# Patient Record
Sex: MALE | Race: WHITE | Employment: FULL TIME | URBAN - NONMETROPOLITAN AREA
[De-identification: names, ages, dates, MRNs, and addresses within clinical notes are randomized per-mention and may not be internally consistent; named-entity substitution may affect disease eponyms.]

---

## 2018-08-07 ENCOUNTER — HOSPITAL ENCOUNTER (EMERGENCY)
Age: 43
Discharge: HOME | End: 2018-08-07
Payer: OTHER GOVERNMENT

## 2018-08-07 ENCOUNTER — APPOINTMENT (OUTPATIENT)
Dept: GENERAL RADIOLOGY | Age: 43
End: 2018-08-07
Payer: OTHER GOVERNMENT

## 2018-08-07 VITALS
HEIGHT: 68 IN | WEIGHT: 232 LBS | OXYGEN SATURATION: 97 % | TEMPERATURE: 97.2 F | DIASTOLIC BLOOD PRESSURE: 96 MMHG | HEART RATE: 84 BPM | BODY MASS INDEX: 35.16 KG/M2 | RESPIRATION RATE: 12 BRPM | SYSTOLIC BLOOD PRESSURE: 159 MMHG

## 2018-08-07 DIAGNOSIS — M25.562 ACUTE PAIN OF LEFT KNEE: Primary | ICD-10-CM

## 2018-08-07 DIAGNOSIS — M25.562 LEFT KNEE PAIN, UNSPECIFIED CHRONICITY: ICD-10-CM

## 2018-08-07 PROCEDURE — 73564 X-RAY EXAM KNEE 4 OR MORE: CPT

## 2018-08-07 PROCEDURE — 99284 EMERGENCY DEPT VISIT MOD MDM: CPT

## 2018-08-07 RX ORDER — IBUPROFEN 600 MG/1
600 TABLET ORAL EVERY 6 HOURS PRN
Qty: 15 TABLET | Refills: 0 | Status: SHIPPED | OUTPATIENT
Start: 2018-08-07 | End: 2021-10-17

## 2018-08-07 ASSESSMENT — ENCOUNTER SYMPTOMS
NAUSEA: 0
RHINORRHEA: 0
VOMITING: 0
DIARRHEA: 0
BACK PAIN: 0
ABDOMINAL PAIN: 0
SORE THROAT: 0
SHORTNESS OF BREATH: 0
COUGH: 0
EYE DISCHARGE: 0
WHEEZING: 0
EYE REDNESS: 0

## 2018-08-07 ASSESSMENT — PAIN SCALES - GENERAL: PAINLEVEL_OUTOF10: 7

## 2018-08-07 ASSESSMENT — PAIN DESCRIPTION - DESCRIPTORS: DESCRIPTORS: SHARP;SHOOTING

## 2018-08-07 ASSESSMENT — PAIN DESCRIPTION - FREQUENCY: FREQUENCY: INTERMITTENT

## 2018-08-07 ASSESSMENT — PAIN DESCRIPTION - PAIN TYPE: TYPE: ACUTE PAIN

## 2018-08-07 ASSESSMENT — PAIN DESCRIPTION - ORIENTATION: ORIENTATION: LEFT

## 2018-08-07 ASSESSMENT — PAIN DESCRIPTION - ONSET: ONSET: ON-GOING

## 2018-08-07 ASSESSMENT — PAIN DESCRIPTION - LOCATION: LOCATION: KNEE

## 2018-08-07 ASSESSMENT — PAIN DESCRIPTION - PROGRESSION: CLINICAL_PROGRESSION: GRADUALLY WORSENING

## 2018-08-07 NOTE — ED PROVIDER NOTES
joint swelling and neck pain. Skin: Negative for pallor and rash. Allergic/Immunologic: Negative for environmental allergies. Neurological: Positive for weakness. Negative for dizziness, syncope, light-headedness, numbness and headaches. Hematological: Negative for adenopathy. Psychiatric/Behavioral: Negative for confusion and suicidal ideas. The patient is not nervous/anxious. PAST MEDICAL HISTORY    has a past medical history of Asthma; Essential hypertension; Obesity (BMI 30.0-34.9); and Pneumonia. SURGICAL HISTORY      has no past surgical history on file. CURRENT MEDICATIONS       Discharge Medication List as of 8/7/2018  3:56 PM      CONTINUE these medications which have NOT CHANGED    Details   cyclobenzaprine (FLEXERIL) 10 MG tablet Take 10 mg by mouth 3 times daily as needed for Muscle spasms      azelastine (ASTELIN) 0.1 % nasal spray 1 spray by Nasal route 2 times daily for 14 days Use in each nostril as directed, Disp-1 Bottle, R-0      sodium chloride (AYR SALINE NASAL DROPS) 0.65 % (SOLN) SOLN nasal drops 2 drops by Each Nare route as needed (congestion)      traMADol (ULTRAM) 50 MG tablet Take 50 mg by mouth every 6 hours as needed for Pain      amLODIPine (NORVASC) 5 MG tablet Take 1 tablet by mouth daily, Disp-30 tablet, R-5      Elastic Bandages & Supports (KNEE BRACE/FLEX STAYS LARGE) MISC DAILY Starting 7/19/2016, Until Discontinued, Disp-1 each, R-0, Print             ALLERGIES     has No Known Allergies. FAMILY HISTORY     indicated that his mother is alive. He indicated that his father is alive. He indicated that the status of his neg hx is unknown.    family history includes Heart Disease in his father. SOCIAL HISTORY      reports that he has never smoked. He does not have any smokeless tobacco history on file. He reports that he drinks alcohol. He reports that he does not use drugs.     PHYSICAL EXAM     INITIAL VITALS:  height is 5' 8\" (1.727 m) and weight is their elevated blood pressure while in the ED and was advised to follow up with their PCP for blood pressure recheck and further management of their antihypertensive medications. CRITICAL CARE:   None    CONSULTS:  None    PROCEDURES:  None    FINAL IMPRESSION      1. Acute on chronic pain of left knee    2. Left knee pain, unspecified chronicity          DISPOSITION/PLAN     1. Acute on chronic pain of left knee    2. Left knee pain, unspecified chronicity        PATIENT REFERRED TO:  Flo Chicas [address]  [facility] [address] [facility] [address]  [phone]  Schedule an appointment as soon as possible for a visit       [place] [id number] [facility] [address]  [phone]  Schedule an appointment as soon as possible for a visit in 2 days      [address]:  Discharge Medication List as of 8/7/2018  3:56 PM          (Please note that portions of this note were completed with a voice recognition program.  Efforts were made to edit the dictations but occasionally words are mis-transcribed.)    Scribe:  Prateek Richards 8/7/18 3:14 PM Scribing for and in the presence of DERRICK Recio. Signed by: Jeanmarie Umana, 08/08/18 6:20 AM    Provider:  I personally performed the services described in the documentation, reviewed and edited the documentation which was dictated to the scribe in my presence, and it accurately records my words and actions.     DERRICK Recio 08/08/18 6:20 AM    DERRICK Recio'        Leticia Recio  08/08/18 6713

## 2018-08-07 NOTE — ED NOTES
Pt presents to ED with c/o left knee pain. Pt states he had knee surgery about a year ago and states he has had pain on and off. Pt states he started a new job where he is putting pressure on knee. Pt rates pain 6/10.      Belem Escobedo, LPN  35/02/25 7759

## 2019-07-13 ENCOUNTER — HOSPITAL ENCOUNTER (EMERGENCY)
Dept: GENERAL RADIOLOGY | Age: 44
Discharge: HOME | End: 2019-07-13
Payer: OTHER GOVERNMENT

## 2019-07-13 ENCOUNTER — HOSPITAL ENCOUNTER (EMERGENCY)
Age: 44
Discharge: HOME | End: 2019-07-13
Payer: OTHER GOVERNMENT

## 2019-07-13 VITALS
HEART RATE: 78 BPM | TEMPERATURE: 98.1 F | DIASTOLIC BLOOD PRESSURE: 83 MMHG | RESPIRATION RATE: 18 BRPM | WEIGHT: 240 LBS | SYSTOLIC BLOOD PRESSURE: 138 MMHG | OXYGEN SATURATION: 95 % | BODY MASS INDEX: 36.49 KG/M2

## 2019-07-13 DIAGNOSIS — S82.891A CLOSED FRACTURE OF RIGHT ANKLE, INITIAL ENCOUNTER: Primary | ICD-10-CM

## 2019-07-13 PROCEDURE — 29515 APPLICATION SHORT LEG SPLINT: CPT

## 2019-07-13 PROCEDURE — 73610 X-RAY EXAM OF ANKLE: CPT

## 2019-07-13 PROCEDURE — 99214 OFFICE O/P EST MOD 30 MIN: CPT | Performed by: NURSE PRACTITIONER

## 2019-07-13 PROCEDURE — 99214 OFFICE O/P EST MOD 30 MIN: CPT

## 2019-07-13 PROCEDURE — 29515 APPLICATION SHORT LEG SPLINT: CPT | Performed by: NURSE PRACTITIONER

## 2019-07-13 PROCEDURE — 2709999900 HC NON-CHARGEABLE SUPPLY

## 2019-07-13 RX ORDER — HYDROCODONE BITARTRATE AND ACETAMINOPHEN 5; 325 MG/1; MG/1
1 TABLET ORAL EVERY 6 HOURS PRN
Qty: 16 TABLET | Refills: 0 | Status: SHIPPED | OUTPATIENT
Start: 2019-07-13 | End: 2019-07-17

## 2019-07-13 ASSESSMENT — PAIN DESCRIPTION - DESCRIPTORS: DESCRIPTORS: SHARP;STABBING;THROBBING

## 2019-07-13 ASSESSMENT — PAIN DESCRIPTION - ORIENTATION: ORIENTATION: RIGHT

## 2019-07-13 ASSESSMENT — PAIN SCALES - GENERAL: PAINLEVEL_OUTOF10: 7

## 2019-07-13 ASSESSMENT — PAIN DESCRIPTION - PAIN TYPE: TYPE: ACUTE PAIN

## 2019-07-13 ASSESSMENT — PAIN DESCRIPTION - LOCATION: LOCATION: ANKLE

## 2019-07-13 ASSESSMENT — PAIN DESCRIPTION - FREQUENCY: FREQUENCY: CONTINUOUS

## 2019-07-13 NOTE — ED PROVIDER NOTES
[facility]       CHIEF COMPLAINT       Chief Complaint   Patient presents with    Ankle Injury     Pt was out back at his house and fell and hurt right ankle. Swollen. happened today. Nurses Notes reviewed and I agree except as noted in the HPI. HISTORY OF PRESENT ILLNESS   Joseph Fontaine is a 37 y.o. male who presents to the urgent care center complaining of pain to the right ankle. Patient states he was at his home this morning when he was carrying a umbrella getting ready for a graduation party when he twisted his right ankle going down a step. Patient states he heard a pop had sudden pain and currently rates his pain 7 on a 10 scale. The patient did not apply any ice or take anything for pain but states he came right here to be evaluated. Patient denies any other injuries. The history is provided by the patient. No  was used. Ankle Problem   Location:  Ankle  Time since incident:  1 hour  Injury: yes    Mechanism of injury comment:  His ankle at home while stepping off a step  Ankle location:  R ankle  Pain details:     Quality:  Aching    Radiates to:  Does not radiate    Severity:  Severe    Onset quality:  Sudden    Duration:  1 hour    Timing:  Constant    Progression:  Unchanged  Chronicity:  New  Dislocation: no    Foreign body present:  No foreign bodies  Tetanus status:  Unknown  Prior injury to area:  No  Relieved by:  Rest  Worsened by:  Abduction, adduction, bearing weight, flexion and extension  Ineffective treatments:  None tried  Associated symptoms: decreased ROM and stiffness    Risk factors: obesity    Risk factors: no concern for non-accidental trauma and no known bone disorder        REVIEW OF SYSTEMS     Review of Systems   Constitutional: Positive for activity change. Musculoskeletal: Positive for arthralgias, gait problem, joint swelling and stiffness.         Right  ankle and foot       PAST MEDICAL (FLEXERIL) 10 MG tablet Comments:   Reason for Stopping:         sodium chloride (AYR SALINE NASAL DROPS) 0.65 % (SOLN) SOLN nasal drops Comments:   Reason for Stopping:         traMADol (ULTRAM) 50 MG tablet Comments:   Reason for Stopping:         amLODIPine (NORVASC) 5 MG tablet Comments:   Reason for Stopping:               Discharge Medication List as of 7/13/2019  1:27 PM            Controlled Substance Monitoring:    Acute and Chronic Pain Monitoring:   RX Monitoring 7/13/2019   Periodic Controlled Substance Monitoring Possible medication side effects, risk of tolerance/dependence & alternative treatments discussed. ;No signs of potential drug abuse or diversion identified. Patient give standard precautions with this class of medications such as may cause drowsiness. May impair ability to operate vehicles or machinery. Do not use in combination with alcohol while taking this medication                                        Supportive Documentation of Scheduled Medication    1. Patient's medical history, current medications,and those prescribed by other   providers and OARRS, were reviewed by me. 2. Physical exam revealed see assessment portion of chart. 3. Diagnosis: Right ankle fracture    4. Contraindications were ruled out, no current pain medication usage, no drug interactions with current medication, no documented history of substance abuse or inpatient/outpatient rehabilitation.      5. Rationale for prescription(s): Norco 5/325 1 p.o. every 6 hours for pain #12 with no refills    JAIME Arana CNP    (Please note that portions of this note were completed with a voice recognition program. Efforts were made to edit the dictations but occasionally words are mis-transcribed.)           JAIME Arana CNP  07/13/19 4506

## 2019-07-13 NOTE — ED TRIAGE NOTES
Pt was wheeled into room 7 by wife. Pt here with complaints of a right foot injury. Pt was at back and stepped wrong and fell. Right ankle swollen and painful. Happened today at his house.

## 2020-01-06 ENCOUNTER — HOSPITAL ENCOUNTER (EMERGENCY)
Age: 45
Discharge: HOME | End: 2020-01-06
Payer: OTHER GOVERNMENT

## 2020-01-06 VITALS
OXYGEN SATURATION: 95 % | RESPIRATION RATE: 16 BRPM | HEART RATE: 80 BPM | BODY MASS INDEX: 37.71 KG/M2 | WEIGHT: 248 LBS | SYSTOLIC BLOOD PRESSURE: 161 MMHG | DIASTOLIC BLOOD PRESSURE: 99 MMHG | TEMPERATURE: 98.7 F

## 2020-01-06 LAB
FLU A ANTIGEN: NEGATIVE
FLU B ANTIGEN: NEGATIVE

## 2020-01-06 PROCEDURE — 87804 INFLUENZA ASSAY W/OPTIC: CPT

## 2020-01-06 PROCEDURE — 99214 OFFICE O/P EST MOD 30 MIN: CPT

## 2020-01-06 PROCEDURE — 99213 OFFICE O/P EST LOW 20 MIN: CPT | Performed by: NURSE PRACTITIONER

## 2020-01-06 RX ORDER — AZITHROMYCIN 250 MG/1
TABLET, FILM COATED ORAL
Qty: 6 TABLET | Refills: 0 | Status: SHIPPED | OUTPATIENT
Start: 2020-01-06 | End: 2021-10-17 | Stop reason: ALTCHOICE

## 2020-01-06 RX ORDER — PREDNISONE 10 MG/1
TABLET ORAL
Qty: 14 TABLET | Refills: 0 | Status: SHIPPED | OUTPATIENT
Start: 2020-01-06 | End: 2021-10-17 | Stop reason: ALTCHOICE

## 2020-01-06 ASSESSMENT — PAIN DESCRIPTION - PAIN TYPE: TYPE: ACUTE PAIN

## 2020-01-06 ASSESSMENT — PAIN DESCRIPTION - DESCRIPTORS: DESCRIPTORS: ACHING

## 2020-01-06 ASSESSMENT — PAIN DESCRIPTION - LOCATION: LOCATION: RIB CAGE

## 2020-01-06 ASSESSMENT — PAIN DESCRIPTION - ORIENTATION: ORIENTATION: RIGHT;LEFT

## 2020-01-06 ASSESSMENT — PAIN DESCRIPTION - FREQUENCY: FREQUENCY: INTERMITTENT

## 2020-01-06 ASSESSMENT — PAIN SCALES - GENERAL: PAINLEVEL_OUTOF10: 5

## 2020-01-06 NOTE — ED PROVIDER NOTES
[facility]  Urgent Care Encounter       CHIEF COMPLAINT       Chief Complaint   Patient presents with    Cough     productive, post nasal drainage, fever       Nurses Notes reviewed and I agree except as noted in the HPI. HISTORY OF PRESENT ILLNESS   Christian Henderson is a 40 y.o. male who presents     Patient states that for the last week he has had more productive cough with increased postnasal drainage. He states that he is also had some fevers, as high as 101. Reports burning sensation when inhaling. He states that he has exercise-induced asthma, but has not had to use an inhaler. Denies ever being a smoker. States that he has been trying over-the-counter cough suppressants and decongestants neither of which seem to be very helpful. Cough   Cough characteristics:  Non-productive  Sputum characteristics:  Nondescript  Severity:  Mild  Onset quality:  Gradual  Duration:  5 days  Timing:  Intermittent  Progression:  Unchanged  Chronicity:  New  Smoker: no    Context: not animal exposure, not exposure to allergens, not fumes, not occupational exposure, not sick contacts, not smoke exposure, not upper respiratory infection, not weather changes and not with activity    Relieved by:  Nothing  Worsened by:  Nothing  Ineffective treatments:  Cough suppressants and decongestant  Associated symptoms: sinus congestion    Associated symptoms: no chest pain, no chills, no ear fullness, no ear pain, no eye discharge, no fever, no headaches, no rash, no rhinorrhea, no shortness of breath, no sore throat, no weight loss and no wheezing    Risk factors: no chemical exposure, no recent infection and no recent travel        REVIEW OF SYSTEMS     Review of Systems   Constitutional: Negative for appetite change, chills, fatigue, fever and weight loss. HENT: Positive for congestion, postnasal drip, sinus pressure and sinus pain. Negative for ear pain, rhinorrhea and sore throat.     Eyes: Negative for discharge. Respiratory: Positive for cough. Negative for chest tightness, shortness of breath, wheezing and stridor. Cardiovascular: Negative for chest pain. Gastrointestinal: Negative for nausea and vomiting. Musculoskeletal: Negative for neck pain and neck stiffness. Skin: Negative for rash. Neurological: Negative for dizziness, weakness, light-headedness, numbness and headaches. PAST MEDICAL HISTORY         Diagnosis Date    Asthma     Essential hypertension 8/17/2016    Obesity (BMI 30.0-34. 9) 8/17/2016    Pneumonia        SURGICALHISTORY     Patient  has a past surgical history that includes knee surgery and toenail excision. CURRENT MEDICATIONS       Discharge Medication List as of 1/6/2020  2:33 PM      CONTINUE these medications which have NOT CHANGED    Details   DM-Phenylephrine-Acetaminophen (VICKS DAYQUIL COLD & FLU) 10-5-325 MG/15ML LIQD Take 30 mLs by mouthHistorical Med      ibuprofen (ADVIL;MOTRIN) 600 MG tablet Take 1 tablet by mouth every 6 hours as needed for Pain, Disp-15 tablet, R-0Print      Elastic Bandages & Supports (KNEE BRACE/FLEX STAYS LARGE) MISC DAILY Starting 7/19/2016, Until Discontinued, Disp-1 each, R-0, Print             ALLERGIES     Patient is has No Known Allergies. Patients   There is no immunization history on file for this patient. FAMILY HISTORY     Patient's family history includes Heart Disease in his father; High Blood Pressure in his mother. SOCIAL HISTORY     Patient  reports that he has never smoked. He has never used smokeless tobacco. He reports current alcohol use. He reports that he does not use drugs. PHYSICAL EXAM     ED TRIAGE VITALS  BP: (!) 161/99, Temp: 98.7 °F (37.1 °C), Pulse: 80, Resp: 16, SpO2: 95 %,Estimated body mass index is 37.71 kg/m² as calculated from the following:    Height as of 8/7/18: 5' 8\" (1.727 m). Weight as of this encounter: 248 lb (112.5 kg). ,No LMP for male patient.     Physical Exam  Constitutional:       General: He is not in acute distress. Appearance: Normal appearance. He is not ill-appearing, toxic-appearing or diaphoretic. HENT:      Nose: Congestion present. No rhinorrhea. Mouth/Throat:      Mouth: Mucous membranes are moist.      Pharynx: No oropharyngeal exudate or posterior oropharyngeal erythema. Cardiovascular:      Rate and Rhythm: Normal rate. Heart sounds: Normal heart sounds. No murmur. No friction rub. No gallop. Pulmonary:      Effort: Pulmonary effort is normal. No accessory muscle usage or respiratory distress. Breath sounds: Normal breath sounds. No stridor or decreased air movement. No decreased breath sounds, wheezing, rhonchi or rales. Chest:      Chest wall: No tenderness. Musculoskeletal: Normal range of motion. Skin:     General: Skin is warm. Findings: No erythema or rash. Neurological:      General: No focal deficit present. Mental Status: He is alert and oriented to person, place, and time. Sensory: No sensory deficit. Psychiatric:         Mood and Affect: Mood normal.         Behavior: Behavior normal.         Thought Content: Thought content normal.         Judgment: Judgment normal.         DIAGNOSTIC RESULTS     Labs:  Results for orders placed or performed during the hospital encounter of 01/06/20   Rapid influenza A/B antigens   Result Value Ref Range    Flu A Antigen NEGATIVE NEGATIVE    Flu B Antigen NEGATIVE NEGATIVE       IMAGING:    No orders to display     URGENT CARE COURSE:     Vitals:    01/06/20 1409   BP: (!) 161/99   Pulse: 80   Resp: 16   Temp: 98.7 °F (37.1 °C)   TempSrc: Oral   SpO2: 95%   Weight: 248 lb (112.5 kg)       Medications - No data to display         PROCEDURES:  None    FINAL IMPRESSION      1. Pleurisy    2. Bronchitis    3. Cough    4.  Upper respiratory tract infection, unspecified type          DISPOSITION/ PLAN   Patient is discharged home with prescription for azithromycin as well as prednisone tapering dose for the next 6 days. Discussed with patient that most respiratory infections are viral, given that his symptoms seem to be lingering and not improving even with use of over-the-counter medications, do feel use of antibiotics and steroid is appropriate. Patient is advised to use mist humidifiers at home, as well as adequate fluid hydration and Tylenol and Motrin. Patient may continue use of over-the-counter cough suppressants at bedtime. Patient should follow-up with his primary care provider within the next 3 to 4 days if symptoms do not seem to be improving.         PATIENT REFERRED TO:  JAIME Anaya CNP  [address]  / [facility] [address]      DISCHARGE MEDICATIONS:  Discharge Medication List as of 1/6/2020  2:33 PM      START taking these medications    Details   predniSONE (DELTASONE) 10 MG tablet 40 mg for days 1 & 2, 20 mg for days 3 & 4, 10 mg for days 5 & 6, Disp-14 tablet, R-0Print      azithromycin (ZITHROMAX) 250 MG tablet 500 mg for day 1, 250 mg for days 2-5, Disp-6 tablet, R-0Print             Discharge Medication List as of 1/6/2020  2:33 PM          Discharge Medication List as of 1/6/2020  2:33 PM          JAIME Zaidi NP    (Please note that portions of this note were completed with a voice recognition program. Efforts were made to edit the dictations but occasionally words are mis-transcribed.)         JAIME Miguel NP  01/10/20 5643

## 2020-01-06 NOTE — ED NOTES
Pt. Released in stable condition, ambulated per self to private car. Instructed pt to follow-up with family doctor as needed for recheck or go directly to the emergency department for any concerns/worsening conditions. Pt. Verbalized understanding of instructions. No questions at this time. RX in hand.       Pushpa Elena RN  01/06/20 9563

## 2020-01-10 ASSESSMENT — ENCOUNTER SYMPTOMS
SHORTNESS OF BREATH: 0
NAUSEA: 0
SORE THROAT: 0
RHINORRHEA: 0
CHEST TIGHTNESS: 0
WHEEZING: 0
SINUS PAIN: 1
COUGH: 1
SINUS PRESSURE: 1
SINUS CONGESTION: 1
STRIDOR: 0
VOMITING: 0
EYE DISCHARGE: 0

## 2020-11-20 ENCOUNTER — APPOINTMENT (OUTPATIENT)
Dept: GENERAL RADIOLOGY | Age: 45
End: 2020-11-20
Payer: OTHER GOVERNMENT

## 2020-11-20 ENCOUNTER — HOSPITAL ENCOUNTER (EMERGENCY)
Age: 45
Discharge: HOME | End: 2020-11-20
Payer: OTHER GOVERNMENT

## 2020-11-20 VITALS
SYSTOLIC BLOOD PRESSURE: 160 MMHG | TEMPERATURE: 97.4 F | RESPIRATION RATE: 19 BRPM | DIASTOLIC BLOOD PRESSURE: 99 MMHG | OXYGEN SATURATION: 98 % | HEART RATE: 99 BPM

## 2020-11-20 PROCEDURE — 6370000000 HC RX 637 (ALT 250 FOR IP): Performed by: NURSE PRACTITIONER

## 2020-11-20 PROCEDURE — 73610 X-RAY EXAM OF ANKLE: CPT

## 2020-11-20 PROCEDURE — 99283 EMERGENCY DEPT VISIT LOW MDM: CPT

## 2020-11-20 RX ORDER — LIDOCAINE 4 G/G
1 PATCH TOPICAL DAILY
Qty: 30 PATCH | Refills: 0 | Status: SHIPPED | OUTPATIENT
Start: 2020-11-20 | End: 2020-12-20

## 2020-11-20 RX ORDER — NAPROXEN 250 MG/1
500 TABLET ORAL ONCE
Status: COMPLETED | OUTPATIENT
Start: 2020-11-20 | End: 2020-11-20

## 2020-11-20 RX ORDER — LIDOCAINE 4 G/G
1 PATCH TOPICAL DAILY
Status: DISCONTINUED | OUTPATIENT
Start: 2020-11-20 | End: 2020-11-20 | Stop reason: HOSPADM

## 2020-11-20 RX ORDER — NAPROXEN 500 MG/1
500 TABLET ORAL 2 TIMES DAILY WITH MEALS
Qty: 30 TABLET | Refills: 0 | Status: SHIPPED | OUTPATIENT
Start: 2020-11-20 | End: 2021-10-17 | Stop reason: ALTCHOICE

## 2020-11-20 RX ADMIN — NAPROXEN 500 MG: 250 TABLET ORAL at 16:16

## 2020-11-20 ASSESSMENT — ENCOUNTER SYMPTOMS
PHOTOPHOBIA: 0
SHORTNESS OF BREATH: 0
ABDOMINAL PAIN: 0
EYE PAIN: 1

## 2020-11-20 ASSESSMENT — PAIN DESCRIPTION - PAIN TYPE: TYPE: ACUTE PAIN

## 2020-11-20 ASSESSMENT — PAIN SCALES - GENERAL: PAINLEVEL_OUTOF10: 8

## 2020-11-20 ASSESSMENT — PAIN DESCRIPTION - LOCATION: LOCATION: ANKLE;BACK;HEAD
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
## 2021-10-17 ENCOUNTER — APPOINTMENT (OUTPATIENT)
Dept: GENERAL RADIOLOGY | Age: 46
End: 2021-10-17
Payer: OTHER GOVERNMENT

## 2021-10-17 ENCOUNTER — HOSPITAL ENCOUNTER (EMERGENCY)
Age: 46
Discharge: HOME | End: 2021-10-17
Payer: OTHER GOVERNMENT

## 2021-10-17 VITALS
RESPIRATION RATE: 16 BRPM | OXYGEN SATURATION: 95 % | SYSTOLIC BLOOD PRESSURE: 165 MMHG | TEMPERATURE: 97 F | HEART RATE: 69 BPM | DIASTOLIC BLOOD PRESSURE: 88 MMHG

## 2021-10-17 DIAGNOSIS — S20.211A RIB CONTUSION, RIGHT, INITIAL ENCOUNTER: Primary | ICD-10-CM

## 2021-10-17 DIAGNOSIS — S29.9XXA RIB INJURY: ICD-10-CM

## 2021-10-17 PROCEDURE — 99212 OFFICE O/P EST SF 10 MIN: CPT | Performed by: NURSE PRACTITIONER

## 2021-10-17 PROCEDURE — 99213 OFFICE O/P EST LOW 20 MIN: CPT

## 2021-10-17 PROCEDURE — 71101 X-RAY EXAM UNILAT RIBS/CHEST: CPT

## 2021-10-17 RX ORDER — CYCLOBENZAPRINE HCL 5 MG
5 TABLET ORAL NIGHTLY
Qty: 5 TABLET | Refills: 0 | Status: SHIPPED | OUTPATIENT
Start: 2021-10-17 | End: 2021-10-22

## 2021-10-17 ASSESSMENT — PAIN SCALES - GENERAL: PAINLEVEL_OUTOF10: 6

## 2021-10-17 ASSESSMENT — ENCOUNTER SYMPTOMS
NAUSEA: 0
WHEEZING: 0
COLOR CHANGE: 0
SHORTNESS OF BREATH: 0
CHEST TIGHTNESS: 0
VOMITING: 0
DIARRHEA: 0
COUGH: 0

## 2021-10-17 NOTE — ED PROVIDER NOTES
DAILY Starting 7/19/2016, Until Discontinued, Disp-1 each, R-0, Print             ALLERGIES     Patient is has No Known Allergies. Patients   There is no immunization history on file for this patient. FAMILY HISTORY     Patient's family history includes Heart Disease in his father; High Blood Pressure in his mother. SOCIAL HISTORY     Patient  reports that he has never smoked. He has never used smokeless tobacco. He reports current alcohol use. He reports that he does not use drugs. PHYSICAL EXAM     ED TRIAGE VITALS  BP: (!) 165/88, Temp: 97 °F (36.1 °C), Pulse: 69, Resp: 16, SpO2: 95 %,Estimated body mass index is 37.71 kg/m² as calculated from the following:    Height as of 8/7/18: 5' 8\" (1.727 m). Weight as of 1/6/20: 248 lb (112.5 kg). ,No LMP for male patient. Physical Exam  Constitutional:       General: He is not in acute distress. Appearance: Normal appearance. He is not ill-appearing, toxic-appearing or diaphoretic. HENT:      Nose: Nose normal.      Mouth/Throat:      Mouth: Mucous membranes are moist.   Cardiovascular:      Rate and Rhythm: Normal rate. Pulses: Normal pulses. Heart sounds: Normal heart sounds. No murmur heard. No friction rub. No gallop. Pulmonary:      Effort: Pulmonary effort is normal. No respiratory distress. Breath sounds: Normal breath sounds. No stridor. No wheezing, rhonchi or rales. Chest:      Chest wall: No tenderness. Abdominal:          Comments: Tenderness to ribs with palpations   Musculoskeletal:         General: Swelling (Mild swelling to point of tenderness), tenderness (right side ribs) and signs of injury (fall at work, yesterday, landing on right side) present. No deformity. Normal range of motion. Right lower leg: No edema. Left lower leg: No edema. Skin:     General: Skin is warm. Capillary Refill: Capillary refill takes less than 2 seconds. Findings: No bruising, erythema or rash.    Neurological: General: No focal deficit present. Mental Status: He is alert and oriented to person, place, and time. Sensory: No sensory deficit. Motor: No weakness. Psychiatric:         Mood and Affect: Mood normal.         Behavior: Behavior normal.         Thought Content: Thought content normal.         Judgment: Judgment normal.         DIAGNOSTIC RESULTS     Labs:No results found for this visit on 10/17/21. IMAGING:    XR RIBS RIGHT INCLUDE CHEST (MIN 3 VIEWS)   Final Result    No fracture. **This report has been created using voice recognition software. It may contain minor errors which are inherent in voice recognition technology. **      Final report electronically signed by Dr. Arie Mccoy on 10/17/2021 11:44 AM        URGENT CARE COURSE:     Vitals:    10/17/21 1057   BP: (!) 165/88   Pulse: 69   Resp: 16   Temp: 97 °F (36.1 °C)   TempSrc: Temporal   SpO2: 95%       Medications - No data to display         PROCEDURES:  None    FINAL IMPRESSION      1. Rib contusion, right, initial encounter    2. Rib injury          DISPOSITION/ PLAN   Patient is discharged home with prescription for Flexeril that he may take at bedtime for muscle spasms due to recent injury of right ribs. Patient is informed that his x-ray is negative for any fractures. Continue use of over-the-counter Tylenol, Aleve, and heating pad. Patient is encouraged to follow-up with occupational health in the next 2 to 3 days if there does not appear to be any improvement of symptoms. PATIENT REFERRED TO:  No primary care provider on file. No primary physician on file.       DISCHARGE MEDICATIONS:  Discharge Medication List as of 10/17/2021 12:11 PM      START taking these medications    Details   cyclobenzaprine (FLEXERIL) 5 MG tablet Take 1 tablet by mouth nightly for 5 days, Disp-5 tablet, R-0Normal             Discharge Medication List as of 10/17/2021 12:11 PM      STOP taking these medications

## 2021-10-17 NOTE — Clinical Note
Judy Mauricio was seen and treated in our emergency department on 10/17/2021. He may return to work on 10/19/2021. If you have any questions or concerns, please don't hesitate to call.       Naomi Pichardo, APRN - NP

## 2022-01-24 ENCOUNTER — HOSPITAL ENCOUNTER (EMERGENCY)
Age: 47
Discharge: HOME | End: 2022-01-24
Payer: OTHER GOVERNMENT

## 2022-01-24 VITALS
SYSTOLIC BLOOD PRESSURE: 154 MMHG | OXYGEN SATURATION: 95 % | RESPIRATION RATE: 16 BRPM | HEART RATE: 82 BPM | HEIGHT: 68 IN | DIASTOLIC BLOOD PRESSURE: 84 MMHG | TEMPERATURE: 97.6 F | WEIGHT: 229 LBS | BODY MASS INDEX: 34.71 KG/M2

## 2022-01-24 DIAGNOSIS — J01.00 ACUTE NON-RECURRENT MAXILLARY SINUSITIS: Primary | ICD-10-CM

## 2022-01-24 PROCEDURE — 99213 OFFICE O/P EST LOW 20 MIN: CPT | Performed by: NURSE PRACTITIONER

## 2022-01-24 PROCEDURE — 99213 OFFICE O/P EST LOW 20 MIN: CPT

## 2022-01-24 RX ORDER — AMOXICILLIN AND CLAVULANATE POTASSIUM 875; 125 MG/1; MG/1
1 TABLET, FILM COATED ORAL 2 TIMES DAILY
Qty: 20 TABLET | Refills: 0 | Status: SHIPPED | OUTPATIENT
Start: 2022-01-24 | End: 2022-02-03

## 2022-01-24 ASSESSMENT — ENCOUNTER SYMPTOMS
COUGH: 0
SINUS PRESSURE: 1
SORE THROAT: 0
SHORTNESS OF BREATH: 0
DIARRHEA: 0
SINUS PAIN: 1
EYE DISCHARGE: 0
NAUSEA: 0
TROUBLE SWALLOWING: 0
EYE REDNESS: 0
RHINORRHEA: 1
VOMITING: 0

## 2022-01-24 ASSESSMENT — PAIN SCALES - GENERAL: PAINLEVEL_OUTOF10: 4

## 2022-01-24 NOTE — Clinical Note
Pradeep Canas was seen and treated in our emergency department on 1/24/2022. He may return to work on 01/25/2022. If you have any questions or concerns, please don't hesitate to call.       Stanley Evans, APRN - CNP

## 2022-01-24 NOTE — ED NOTES
To [facility] with complaints of head congestion. Thinks he has a sinus infection.  Started about 10 days ago     Ryder Purvis RN  01/24/22 1737

## 2022-01-24 NOTE — ED PROVIDER NOTES
[address]       Chief Complaint   Patient presents with    Head Congestion       Nurses Notes reviewed and I agree except as noted in the HPI. HISTORY OF PRESENT ILLNESS   Corry Lynn is a 55 y.o. male who presents for evaluation of sinus congestion. Onset 10 days ago, worsening. Patient complains of sinus congestion with sinus pain, pressure, headache. Pain radiating towards bilateral ears. No fever. No loss of taste or smell. No COVID-19 exposure. No improvement with current treatment. REVIEW OF SYSTEMS     Review of Systems   Constitutional: Negative for chills, diaphoresis, fatigue and fever. HENT: Positive for congestion, postnasal drip, rhinorrhea, sinus pressure and sinus pain. Negative for ear pain, sore throat and trouble swallowing. Eyes: Negative for discharge and redness. Respiratory: Negative for cough and shortness of breath. Cardiovascular: Negative for chest pain. Gastrointestinal: Negative for diarrhea, nausea and vomiting. Genitourinary: Negative for decreased urine volume. Musculoskeletal: Negative for neck pain and neck stiffness. Skin: Negative for rash. Neurological: Positive for headaches. Hematological: Negative for adenopathy. Psychiatric/Behavioral: Negative for sleep disturbance. PAST MEDICAL HISTORY         Diagnosis Date    Asthma     Diabetes mellitus ([facility] [address]     Essential hypertension 8/17/2016    Hyperlipidemia     Obesity (BMI 30.0-34. 9) 8/17/2016    Pneumonia        SURGICAL HISTORY     Patient  has a past surgical history that includes knee surgery and toenail excision.     CURRENT MEDICATIONS       Discharge Medication List as of 1/24/2022  2:42 PM      CONTINUE these medications which have NOT CHANGED    Details   LISINOPRIL PO Take by mouthHistorical Med      UNKNOWN TO PATIENT Historical Med      METFORMIN HCL ER PO Take by mouthHistorical Med      Elastic Bandages & Supports (KNEE BRACE/FLEX STAYS LARGE) MISC DAILY Starting 7/19/2016, Until Discontinued, Disp-1 each, R-0, Print             ALLERGIES     Patient is has No Known Allergies. FAMILY HISTORY     Patient'sfamily history includes Heart Disease in his father; High Blood Pressure in his mother. SOCIAL HISTORY     Patient  reports that he has never smoked. He has never used smokeless tobacco. He reports current alcohol use. He reports that he does not use drugs. PHYSICAL EXAM     ED TRIAGE VITALS  BP: (!) 154/84, Temp: 97.6 °F (36.4 °C), Pulse: 82, Resp: 16, SpO2: 95 %  Physical Exam  Vitals and nursing note reviewed. Constitutional:       General: He is not in acute distress. Appearance: Normal appearance. He is well-developed. He is not ill-appearing, toxic-appearing or diaphoretic. HENT:      Head: Normocephalic and atraumatic. Jaw: No trismus. Right Ear: Hearing, tympanic membrane, ear canal and external ear normal. No mastoid tenderness. No hemotympanum. Tympanic membrane is not perforated, erythematous or bulging. Left Ear: Hearing, tympanic membrane, ear canal and external ear normal. No mastoid tenderness. No hemotympanum. Tympanic membrane is not perforated, erythematous or bulging. Nose: Congestion present. No rhinorrhea. Right Sinus: Maxillary sinus tenderness present. Left Sinus: Maxillary sinus tenderness present. Mouth/Throat:      Mouth: Mucous membranes are moist.      Pharynx: Oropharynx is clear. Uvula midline. Tonsils: No tonsillar abscesses. Eyes:      General: No scleral icterus. Conjunctiva/sclera: Conjunctivae normal.   Neck:      Thyroid: No thyromegaly. Trachea: Trachea normal.   Cardiovascular:      Rate and Rhythm: Normal rate and regular rhythm. No extrasystoles are present. Chest Wall: PMI is not displaced. Heart sounds: Normal heart sounds. No murmur heard. No friction rub. No gallop.     Pulmonary: Effort: Pulmonary effort is normal. No accessory muscle usage or respiratory distress. Breath sounds: Normal breath sounds. Chest:   Breasts:      Right: No supraclavicular adenopathy. Left: No supraclavicular adenopathy. Musculoskeletal:      Cervical back: Normal range of motion and neck supple. Lymphadenopathy:      Head:      Right side of head: No submental, submandibular, tonsillar, preauricular, posterior auricular or occipital adenopathy. Left side of head: No submental, submandibular, tonsillar, preauricular, posterior auricular or occipital adenopathy. Cervical: No cervical adenopathy. Upper Body:      Right upper body: No supraclavicular adenopathy. Left upper body: No supraclavicular adenopathy. Skin:     General: Skin is warm and dry. Coloration: Skin is not pale. Findings: No rash. Comments: Skin intact, warm and dry to touch, no rashes noted on exposed surfaces. Neurological:      Mental Status: He is alert and oriented to person, place, and time. He is not disoriented. Psychiatric:         Mood and Affect: Mood normal.         Behavior: Behavior is cooperative. DIAGNOSTIC RESULTS   Labs: No results found for this visit on 01/24/22. IMAGING:  No orders to display     URGENT CARE COURSE:     Vitals:    01/24/22 1433   BP: (!) 154/84   Pulse: 82   Resp: 16   Temp: 97.6 °F (36.4 °C)   TempSrc: Temporal   SpO2: 95%   Weight: 229 lb (103.9 kg)   Height: 5' 8\" (1.727 m)       Medications - No data to display  PROCEDURES:  None  FINALIMPRESSION      1. Acute non-recurrent maxillary sinusitis        DISPOSITION/PLAN   DISPOSITION Decision To Discharge 01/24/2022 02:41:22 PM  Nontoxic, no distress. Treat for maxillary sinusitis. Medication as prescribed. Daily yogurt/probiotic. Continue current treatment. If symptoms worsen return or go to ER.   PATIENT REFERRED TO:  [address] [facility] [address] [address] EMERGENCY DEPT  [address] [facility] [address]  [phone]    Follow-up as needed. Medication as prescribed. Daily yogurt/probiotic. Continue Mucinex. If symptoms worsen go to ER.     DISCHARGE MEDICATIONS:  Discharge Medication List as of 1/24/2022  2:42 PM      START taking these medications    Details   amoxicillin-clavulanate (AUGMENTIN) 875-125 MG per tablet Take 1 tablet by mouth 2 times daily for 10 days, Disp-20 tablet, R-0Normal           Discharge Medication List as of 1/24/2022  2:42 PM          JAIME Schaffer CNP, JAIME Taylor CNP  01/24/22 1519

## 2022-03-17 ENCOUNTER — HOSPITAL ENCOUNTER (EMERGENCY)
Age: 47
Discharge: HOME | End: 2022-03-17
Payer: OTHER GOVERNMENT

## 2022-03-17 VITALS
HEART RATE: 82 BPM | OXYGEN SATURATION: 98 % | RESPIRATION RATE: 19 BRPM | DIASTOLIC BLOOD PRESSURE: 84 MMHG | HEIGHT: 68 IN | BODY MASS INDEX: 34.86 KG/M2 | WEIGHT: 230 LBS | TEMPERATURE: 97.8 F | SYSTOLIC BLOOD PRESSURE: 152 MMHG

## 2022-03-17 DIAGNOSIS — U07.1 COVID-19 VIRUS INFECTION: Primary | ICD-10-CM

## 2022-03-17 LAB
FLU A ANTIGEN: NEGATIVE
FLU B ANTIGEN: NEGATIVE
SARS-COV-2, NAA: DETECTED

## 2022-03-17 PROCEDURE — 87635 SARS-COV-2 COVID-19 AMP PRB: CPT

## 2022-03-17 PROCEDURE — 99213 OFFICE O/P EST LOW 20 MIN: CPT

## 2022-03-17 PROCEDURE — 99213 OFFICE O/P EST LOW 20 MIN: CPT | Performed by: NURSE PRACTITIONER

## 2022-03-17 PROCEDURE — 87804 INFLUENZA ASSAY W/OPTIC: CPT

## 2022-03-17 ASSESSMENT — ENCOUNTER SYMPTOMS
TROUBLE SWALLOWING: 0
VOMITING: 0
NAUSEA: 0
SHORTNESS OF BREATH: 1
EYE DISCHARGE: 0
RHINORRHEA: 1
CHEST TIGHTNESS: 0
WHEEZING: 0
DIARRHEA: 0
SORE THROAT: 0
EYE REDNESS: 0
COUGH: 1

## 2022-03-17 NOTE — ED TRIAGE NOTES
Pt presents to UC with c/o generalized body aches, fevers at home, and congestion that started last night. Pt reports \"its either flu or covid. \" Pt wanting to be tested for both. Afebrile at this time.

## 2022-03-17 NOTE — Clinical Note
Jaime Guzman was seen and treated in our emergency department on 3/17/2022. COVID19 virus is suspected. Per the CDC guidelines we recommend home isolation until the following conditions are all met:    1. At least five days have passed since symptoms first appeared and/or had a close exposure,   2. After home isolation for five days, wearing a mask around others for the next five days,  3. At least 24 have passed since last fever without the use of fever-reducing medications and  4. Symptoms (eg cough, shortness of breath) have improved    If you have any questions or concerns, please don't hesitate to call.     He may return to work/school on 03/21/2022        Kaylen Noel, JAIME - CNP

## 2022-03-17 NOTE — ED PROVIDER NOTES
[address] [facility] Encounter      CHIEFCOMPLAINT       Chief Complaint   Patient presents with    Generalized Body Aches       Nurses Notes reviewed and I agree except as noted in the HPI. HISTORY OF PRESENT ILLNESS   Amy Ahuja is a 55 y.o. male who presents for evaluation of body aches. Onset of symptoms yesterday, unchanged. He complains of body aches with fever, sinus congestion. He also noted dizziness last night, none at this time. Patient had also noted some difficulty breathing. No chest pain, wheezing, chest tightness. No travel. No known exposure to similar symptoms. Requesting COVID, influenza testing. No treatment prior to arrival.    REVIEW OF SYSTEMS     Review of Systems   Constitutional: Positive for chills, diaphoresis, fatigue and fever. HENT: Positive for congestion, postnasal drip and rhinorrhea. Negative for ear pain, sore throat and trouble swallowing. Eyes: Negative for discharge and redness. Respiratory: Positive for cough and shortness of breath. Negative for chest tightness and wheezing. Cardiovascular: Negative for chest pain. Gastrointestinal: Negative for diarrhea, nausea and vomiting. Genitourinary: Negative for decreased urine volume. Musculoskeletal: Positive for myalgias. Negative for neck pain and neck stiffness. Skin: Negative for rash. Neurological: Positive for dizziness and headaches. Negative for syncope. Hematological: Negative for adenopathy. Psychiatric/Behavioral: Negative for sleep disturbance. PAST MEDICAL HISTORY         Diagnosis Date    Asthma     Diabetes mellitus ([address]     Essential hypertension 8/17/2016    Hyperlipidemia     Obesity (BMI 30.0-34. 9) 8/17/2016    Pneumonia        SURGICAL HISTORY     Patient  has a past surgical history that includes knee surgery and toenail excision.     CURRENT MEDICATIONS       Discharge Medication List as of 3/17/2022  4:56 PM      CONTINUE these medications which have NOT CHANGED    Details   LISINOPRIL PO Take by mouthHistorical Med      UNKNOWN TO PATIENT Historical Med      METFORMIN HCL ER PO Take by mouthHistorical Med      Elastic Bandages & Supports (KNEE BRACE/FLEX STAYS LARGE) MISC DAILY Starting 7/19/2016, Until Discontinued, Disp-1 each, R-0, Print             ALLERGIES     Patient is has No Known Allergies. FAMILY HISTORY     Patient'sfamily history includes Heart Disease in his father; High Blood Pressure in his mother. SOCIAL HISTORY     Patient  reports that he has never smoked. He has never used smokeless tobacco. He reports current alcohol use. He reports that he does not use drugs. PHYSICAL EXAM     ED TRIAGE VITALS  BP: (!) 152/84, Temp: 97.8 °F (36.6 °C), Pulse: 82, Resp: 19, SpO2: 98 %  Physical Exam  Vitals and nursing note reviewed. Constitutional:       General: He is not in acute distress. Appearance: Normal appearance. He is well-developed. He is not ill-appearing, toxic-appearing or diaphoretic. HENT:      Head: Normocephalic and atraumatic. Jaw: No trismus. Right Ear: Hearing, tympanic membrane, ear canal and external ear normal. No mastoid tenderness. No hemotympanum. Tympanic membrane is not perforated, erythematous or bulging. Left Ear: Hearing, tympanic membrane, ear canal and external ear normal. No mastoid tenderness. No hemotympanum. Tympanic membrane is not perforated, erythematous or bulging. Nose: Congestion present. Mouth/Throat:      Mouth: Mucous membranes are moist.      Pharynx: Oropharynx is clear. Uvula midline. Tonsils: No tonsillar abscesses. Eyes:      General: No scleral icterus. Conjunctiva/sclera: Conjunctivae normal.   Neck:      Thyroid: No thyromegaly. Trachea: Trachea normal.   Cardiovascular:      Rate and Rhythm: Normal rate and regular rhythm. No extrasystoles are present. Chest Wall: PMI is not displaced.       Heart sounds: Normal heart sounds. No murmur heard. No friction rub. No gallop. Pulmonary:      Effort: Pulmonary effort is normal. No accessory muscle usage or respiratory distress. Breath sounds: Normal breath sounds. Chest:   Breasts:      Right: No supraclavicular adenopathy. Left: No supraclavicular adenopathy. Musculoskeletal:      Cervical back: Normal range of motion and neck supple. Lymphadenopathy:      Head:      Right side of head: No submental, submandibular, tonsillar, preauricular, posterior auricular or occipital adenopathy. Left side of head: No submental, submandibular, tonsillar, preauricular, posterior auricular or occipital adenopathy. Cervical: No cervical adenopathy. Upper Body:      Right upper body: No supraclavicular adenopathy. Left upper body: No supraclavicular adenopathy. Skin:     General: Skin is warm and dry. Coloration: Skin is not pale. Findings: No rash. Comments: Skin intact, warm and dry to touch, no rashes noted on exposed surfaces. Neurological:      Mental Status: He is alert and oriented to person, place, and time. He is not disoriented. Psychiatric:         Mood and Affect: Mood normal.         Behavior: Behavior is cooperative.          DIAGNOSTIC RESULTS   Labs:   Results for orders placed or performed during the hospital encounter of 03/17/22   COVID-19, Rapid   Result Value Ref Range    SARS-CoV-2, DAVID DETECTED (AA) NOT DETECTED   Rapid influenza A/B antigens   Result Value Ref Range    Flu A Antigen Negative NEGATIVE    Flu B Antigen Negative NEGATIVE       IMAGING:  No orders to display     URGENT CARE COURSE:     Vitals:    03/17/22 1632 03/17/22 1633   BP:  (!) 152/84   Pulse: 82    Resp: 19    Temp: 97.8 °F (36.6 °C)    SpO2: 98%    Weight: 230 lb (104.3 kg)    Height: 5' 8\" (1.727 m)        Medications - No data to display  PROCEDURES:  None  FINALIMPRESSION      1. COVID-19 virus infection        DISPOSITION/PLAN   DISPOSITION Decision To Discharge 03/17/2022 04:55:44 PM  Nontoxic, no distress. COVID-19 positive. Over-the-counter treatment as needed. Increase fluids, rest.  If any distress go to ER. PATIENT REFERRED TO:  [facility] EMERGENCY DEPT  [address]    Follow-up as needed. Over-the-counter treatment as needed. Increase fluids, rest.  If symptoms worsen return or go to ER.     DISCHARGE MEDICATIONS:  Discharge Medication List as of 3/17/2022  4:56 PM        Discharge Medication List as of 3/17/2022  4:56 PM          [address], APRN - CNP  03/17/22 8000

## 2022-04-09 ENCOUNTER — APPOINTMENT (OUTPATIENT)
Dept: GENERAL RADIOLOGY | Age: 47
End: 2022-04-09
Payer: OTHER GOVERNMENT

## 2022-04-09 ENCOUNTER — HOSPITAL ENCOUNTER (EMERGENCY)
Age: 47
Discharge: HOME | End: 2022-04-09
Payer: OTHER GOVERNMENT

## 2022-04-09 VITALS
BODY MASS INDEX: 34.97 KG/M2 | TEMPERATURE: 98.3 F | RESPIRATION RATE: 18 BRPM | SYSTOLIC BLOOD PRESSURE: 165 MMHG | OXYGEN SATURATION: 96 % | DIASTOLIC BLOOD PRESSURE: 89 MMHG | HEART RATE: 84 BPM | WEIGHT: 230 LBS

## 2022-04-09 DIAGNOSIS — S22.088A OTHER FRACTURE OF T11-T12 VERTEBRA, INITIAL ENCOUNTER FOR CLOSED FRACTURE (HCC): ICD-10-CM

## 2022-04-09 DIAGNOSIS — M54.6 ACUTE THORACIC BACK PAIN, UNSPECIFIED BACK PAIN LATERALITY: ICD-10-CM

## 2022-04-09 DIAGNOSIS — S30.0XXA LUMBAR CONTUSION, INITIAL ENCOUNTER: Primary | ICD-10-CM

## 2022-04-09 PROCEDURE — 99214 OFFICE O/P EST MOD 30 MIN: CPT | Performed by: EMERGENCY MEDICINE

## 2022-04-09 PROCEDURE — 72100 X-RAY EXAM L-S SPINE 2/3 VWS: CPT

## 2022-04-09 PROCEDURE — 72072 X-RAY EXAM THORAC SPINE 3VWS: CPT

## 2022-04-09 PROCEDURE — 99213 OFFICE O/P EST LOW 20 MIN: CPT

## 2022-04-09 RX ORDER — CYCLOBENZAPRINE HCL 10 MG
10 TABLET ORAL 3 TIMES DAILY PRN
Qty: 21 TABLET | Refills: 0 | Status: SHIPPED | OUTPATIENT
Start: 2022-04-09 | End: 2022-04-19

## 2022-04-09 RX ORDER — IBUPROFEN 800 MG/1
800 TABLET ORAL 4 TIMES DAILY PRN
Qty: 30 TABLET | Refills: 0 | Status: SHIPPED | OUTPATIENT
Start: 2022-04-09

## 2022-04-09 ASSESSMENT — PAIN - FUNCTIONAL ASSESSMENT
PAIN_FUNCTIONAL_ASSESSMENT: 0-10
PAIN_FUNCTIONAL_ASSESSMENT: PREVENTS OR INTERFERES SOME ACTIVE ACTIVITIES AND ADLS

## 2022-04-09 ASSESSMENT — ENCOUNTER SYMPTOMS
COLOR CHANGE: 0
ABDOMINAL PAIN: 0
BACK PAIN: 1

## 2022-04-09 ASSESSMENT — PAIN DESCRIPTION - PAIN TYPE: TYPE: ACUTE PAIN

## 2022-04-09 ASSESSMENT — PAIN SCALES - GENERAL: PAINLEVEL_OUTOF10: 5

## 2022-04-09 ASSESSMENT — PAIN DESCRIPTION - DESCRIPTORS: DESCRIPTORS: ACHING

## 2022-04-09 ASSESSMENT — PAIN DESCRIPTION - FREQUENCY: FREQUENCY: CONTINUOUS

## 2022-04-09 ASSESSMENT — PAIN DESCRIPTION - PROGRESSION: CLINICAL_PROGRESSION: GRADUALLY WORSENING

## 2022-04-09 ASSESSMENT — PAIN DESCRIPTION - ORIENTATION: ORIENTATION: LEFT

## 2022-04-09 ASSESSMENT — PAIN DESCRIPTION - LOCATION: LOCATION: BACK;LEG

## 2022-04-09 NOTE — ED PROVIDER NOTES
[facility]  Urgent Care Encounter       CHIEF COMPLAINT       Chief Complaint   Patient presents with    Back Pain     (L) leg pain       Nurses Notes reviewed and I agree except as noted in the HPI. HISTORY OF PRESENT ILLNESS   Pearle Schwab is a 55 y.o. male who presents for complaints of back pain. Patient states yesterday at 10:30 AM he slipped and fell down several stairs at his house. He states he was able to stand up and was able to go to work but he was in a lot of pain. Patient states he has been having low back pain, mid back pain, hip and pelvis pain. He is able to ambulate without difficulty. No numbness or tingling down the legs. HPI    REVIEW OF SYSTEMS     Review of Systems   Constitutional: Negative for chills, fatigue and fever. Gastrointestinal: Negative for abdominal pain. Genitourinary: Positive for hematuria. Musculoskeletal: Positive for back pain. Negative for gait problem, neck pain and neck stiffness. Skin: Negative for color change. Neurological: Negative for dizziness and headaches. PAST MEDICAL HISTORY         Diagnosis Date    Asthma     Diabetes mellitus ([facility] [address]     Essential hypertension 8/17/2016    Hyperlipidemia     Obesity (BMI 30.0-34. 9) 8/17/2016    Pneumonia        SURGICALHISTORY     Patient  has a past surgical history that includes knee surgery and toenail excision. CURRENT MEDICATIONS       Discharge Medication List as of 4/9/2022  2:35 PM      CONTINUE these medications which have NOT CHANGED    Details   LISINOPRIL PO Take by mouthHistorical Med      UNKNOWN TO PATIENT Historical Med      METFORMIN HCL ER PO Take by mouthHistorical Med      Elastic Bandages & Supports (KNEE BRACE/FLEX STAYS LARGE) MISC DAILY Starting 7/19/2016, Until Discontinued, Disp-1 each, R-0, Print             ALLERGIES     Patient is has No Known Allergies. Patients   There is no immunization history on file for this patient.     FAMILY HISTORY     Patient's family history includes Heart Disease in his father; High Blood Pressure in his mother. SOCIAL HISTORY     Patient  reports that he has never smoked. He has never used smokeless tobacco. He reports current alcohol use. He reports that he does not use drugs. PHYSICAL EXAM     ED TRIAGE VITALS  BP: (!) 165/89, Temp: 98.3 °F (36.8 °C), Pulse: 84, Resp: 18, SpO2: 96 %,Estimated body mass index is 34.97 kg/m² as calculated from the following:    Height as of 3/17/22: 5' 8\" (1.727 m). Weight as of this encounter: 230 lb (104.3 kg). ,No LMP for male patient. Physical Exam  Constitutional:       General: He is not in acute distress. Appearance: He is normal weight. He is not ill-appearing. Cardiovascular:      Rate and Rhythm: Normal rate. Pulmonary:      Effort: Pulmonary effort is normal.   Musculoskeletal:      Right shoulder: No deformity. Left shoulder: No deformity. Cervical back: Normal.      Thoracic back: Tenderness and bony tenderness present. No spasms. Lumbar back: Tenderness and bony tenderness present. Skin:     General: Skin is warm and dry. Capillary Refill: Capillary refill takes less than 2 seconds. Neurological:      General: No focal deficit present. Mental Status: He is alert. Psychiatric:         Mood and Affect: Mood normal.         Behavior: Behavior normal.         DIAGNOSTIC RESULTS     Labs:No results found for this visit on 04/09/22. IMAGING:    XR LUMBAR SPINE (2-3 VIEWS)   Final Result       1. No acute fracture. 2. Ventral osteophytes at L2, L3 and L4.   3. Otherwise negative lumbar spine series. .               **This report has been created using voice recognition software. It may contain minor errors which are inherent in voice recognition technology. **      Final report electronically signed by DR Jeffery Orr on 4/9/2022 2:02 PM      XR THORACIC SPINE (3 VIEWS)   Final Result       1.  Question slight irregularity along the superior endplate of approximately the T11 vertebral body. MRI scan may be helpful for better evaluation. 2. Degenerative changes in the mid and lower thoracic spine. 3. Otherwise negative thoracic spine radiographs. .               **This report has been created using voice recognition software. It may contain minor errors which are inherent in voice recognition technology. **      Final report electronically signed by DR Kely Crook on 4/9/2022 2:06 PM            EKG:      URGENT CARE COURSE:     Vitals:    04/09/22 1334 04/09/22 1335 04/09/22 1339   BP:  (!) 165/89    Pulse: 84     Resp: 18     Temp: 98.3 °F (36.8 °C)     TempSrc: Temporal     SpO2: 96%     Weight:   230 lb (104.3 kg)       Medications - No data to display         PROCEDURES:  None    FINAL IMPRESSION      1. Lumbar contusion, initial encounter    2. Acute thoracic back pain, unspecified back pain laterality    3. Other fracture of t11-T12 vertebra, initial encounter for closed fracture [facility])          DISPOSITION/ PLAN     Presents for back pain after falling down several steps at home. X-rays were performed of the lumbar and thoracic spine. Radiologist interpretation of the thoracic spine shows questionable endplate fracture at the T11 vertebrae. There also osteophytes noted on the lumbar spine series. I contacted Ladkelly Rivera, on-call for spine surgery and advised of possible endplate fracture. I advised there is no neurological deficits. Pain is tolerable. He advises that he can see patient in the office on April 12th at 8 AM.  Patient be discharged with prescription for ibuprofen, Flexeril and advised to ice the area frequently. Advise no lifting. Taken off work until after appointment. Advised to follow-up with Dr. Kenneth Antoine as scheduled. Return for new or worsening symptoms      PATIENT REFERRED TO:  No primary care provider on file. No primary physician on file.       DISCHARGE MEDICATIONS:  Discharge Medication List as of 4/9/2022  2:35 PM      START taking these medications    Details   ibuprofen (ADVIL;MOTRIN) 800 MG tablet Take 1 tablet by mouth 4 times daily as needed for Pain, Disp-30 tablet, R-0Normal      cyclobenzaprine (FLEXERIL) 10 MG tablet Take 1 tablet by mouth 3 times daily as needed for Muscle spasms, Disp-21 tablet, R-0Normal             Discharge Medication List as of 4/9/2022  2:35 PM          Discharge Medication List as of 4/9/2022  2:35 PM          JAIME Ryan CNP    (Please note that portions of this note were completed with a voice recognition program. Efforts were made to edit the dictations but occasionally words are mis-transcribed.)          JAIME Ryan CNP  04/09/22 9108

## 2022-04-09 NOTE — ED TRIAGE NOTES
Patient ambulated to room with c/o generalized back pain and left leg pain after falling down five stairs while in his home yesterday. Denies loss of consciousness.

## 2022-04-09 NOTE — Clinical Note
Tara Parson was seen and treated in our emergency department on 4/9/2022. He may return to work on 04/13/2022. If you have any questions or concerns, please don't hesitate to call.       Daniela Laura, JAIME - CNP

## 2022-10-13 ENCOUNTER — HOSPITAL ENCOUNTER (EMERGENCY)
Age: 47
Discharge: HOME | End: 2022-10-13
Payer: COMMERCIAL

## 2022-10-13 VITALS
SYSTOLIC BLOOD PRESSURE: 176 MMHG | RESPIRATION RATE: 18 BRPM | WEIGHT: 240 LBS | OXYGEN SATURATION: 96 % | HEART RATE: 87 BPM | TEMPERATURE: 98.4 F | BODY MASS INDEX: 36.49 KG/M2 | DIASTOLIC BLOOD PRESSURE: 86 MMHG

## 2022-10-13 DIAGNOSIS — S29.012A STRAIN OF THORACIC BACK REGION: Primary | ICD-10-CM

## 2022-10-13 PROCEDURE — 99213 OFFICE O/P EST LOW 20 MIN: CPT

## 2022-10-13 PROCEDURE — 99213 OFFICE O/P EST LOW 20 MIN: CPT | Performed by: NURSE PRACTITIONER

## 2022-10-13 RX ORDER — CYCLOBENZAPRINE HCL 10 MG
10 TABLET ORAL 3 TIMES DAILY PRN
Qty: 15 TABLET | Refills: 0 | Status: SHIPPED | OUTPATIENT
Start: 2022-10-13

## 2022-10-13 ASSESSMENT — ENCOUNTER SYMPTOMS
BACK PAIN: 1
SHORTNESS OF BREATH: 0
NAUSEA: 0
VOMITING: 0

## 2022-10-13 ASSESSMENT — PAIN DESCRIPTION - DESCRIPTORS: DESCRIPTORS: ACHING

## 2022-10-13 ASSESSMENT — PAIN SCALES - GENERAL: PAINLEVEL_OUTOF10: 7

## 2022-10-13 ASSESSMENT — PAIN - FUNCTIONAL ASSESSMENT
PAIN_FUNCTIONAL_ASSESSMENT: PREVENTS OR INTERFERES SOME ACTIVE ACTIVITIES AND ADLS
PAIN_FUNCTIONAL_ASSESSMENT: 0-10

## 2022-10-13 ASSESSMENT — PAIN DESCRIPTION - PAIN TYPE: TYPE: ACUTE PAIN

## 2022-10-13 ASSESSMENT — PAIN DESCRIPTION - ORIENTATION: ORIENTATION: MID

## 2022-10-13 ASSESSMENT — PAIN DESCRIPTION - FREQUENCY: FREQUENCY: CONTINUOUS

## 2022-10-13 ASSESSMENT — PAIN DESCRIPTION - LOCATION: LOCATION: BACK

## 2022-10-13 NOTE — ED PROVIDER NOTES
[facility]  Urgent Care Encounter       CHIEF COMPLAINT       Chief Complaint   Patient presents with    Back Pain     MId       Nurses Notes reviewed and I agree except as noted in the HPI. HISTORY OF PRESENT ILLNESS   Amarilys Bean is a 55 y.o. male who presents for evaluation of mid to upper back pain. Patient states that he was at work at [employer] this morning around 10:00 when he was trying to get a muffler off the wall. States that other muffler fell on him, causing him to twist his back in an awkward position and states that some of the mufflers it hit him in the back. Patient reports that he does have a history of a few bulging disc in his thoracic back after a fall down some stairs multiple months ago. He denies any numbness or tingling in any of the upper extremities. He states that he did take a tramadol that is prescribed to him for a knee issue for the pain which did provide some relief. The history is provided by the patient. REVIEW OF SYSTEMS     Review of Systems   Constitutional:  Negative for chills and fever. Respiratory:  Negative for shortness of breath. Cardiovascular:  Negative for chest pain. Gastrointestinal:  Negative for nausea and vomiting. Musculoskeletal:  Positive for back pain. Skin:  Negative for rash. Neurological:  Negative for weakness and numbness. PAST MEDICAL HISTORY         Diagnosis Date    Asthma     Diabetes mellitus ([address]     Essential hypertension 8/17/2016    Hyperlipidemia     Obesity (BMI 30.0-34. 9) 8/17/2016    Pneumonia        SURGICALHISTORY     Patient  has a past surgical history that includes knee surgery and toenail excision.     CURRENT MEDICATIONS       Previous Medications    ELASTIC BANDAGES & SUPPORTS (KNEE BRACE/FLEX STAYS LARGE) MISC    1 Device by Does not apply route daily    IBUPROFEN (ADVIL;MOTRIN) 800 MG TABLET    Take 1 tablet by mouth 4 times daily as needed for Pain LISINOPRIL PO    Take by mouth    METFORMIN HCL ER PO    Take by mouth    UNKNOWN TO PATIENT           ALLERGIES     Patient is has No Known Allergies. Patients   There is no immunization history on file for this patient. FAMILY HISTORY     Patient's family history includes Heart Disease in his father; High Blood Pressure in his mother. SOCIAL HISTORY     Patient  reports that he has never smoked. He has never used smokeless tobacco. He reports current alcohol use. He reports that he does not use drugs. PHYSICAL EXAM     ED TRIAGE VITALS  BP: (!) 176/86, Temp: 98.4 °F (36.9 °C), Heart Rate: 87, Resp: 18, SpO2: 96 %,Estimated body mass index is 36.49 kg/m² as calculated from the following:    Height as of 3/17/22: 5' 8\" (1.727 m). Weight as of this encounter: 240 lb (108.9 kg). ,No LMP for male patient. Physical Exam  Vitals and nursing note reviewed. Constitutional:       General: He is not in acute distress. Appearance: He is well-developed. He is not diaphoretic. Eyes:      Conjunctiva/sclera:      Right eye: Right conjunctiva is not injected. Left eye: Left conjunctiva is not injected. Pupils: Pupils are equal.   Cardiovascular:      Rate and Rhythm: Normal rate and regular rhythm. Heart sounds: No murmur heard. Pulmonary:      Effort: Pulmonary effort is normal. No respiratory distress. Breath sounds: Normal breath sounds. Musculoskeletal:      Cervical back: Normal and normal range of motion. Thoracic back: Tenderness present. No bony tenderness. Decreased range of motion. Back:       Comments: There is no evidence of ecchymosis or hematoma to the back    Skin:     General: Skin is warm. Findings: No rash. Neurological:      Mental Status: He is alert and oriented to person, place, and time. Psychiatric:         Behavior: Behavior normal.       DIAGNOSTIC RESULTS     Labs:No results found for this visit on 10/13/22.     IMAGING:    No orders to display         EKG:      URGENT CARE COURSE:     Vitals:    10/13/22 1704   BP: (!) 176/86   Pulse: 87   Resp: 18   Temp: 98.4 °F (36.9 °C)   TempSrc: Temporal   SpO2: 96%   Weight: 240 lb (108.9 kg)       Medications - No data to display         PROCEDURES:  None    FINAL IMPRESSION      1. Strain of thoracic back region          DISPOSITION/ PLAN     I discussed with the patient that exam is consistent with a thoracic back strain. Although this is a work injury, we are unable to provide an x-ray of the back based on the equipment available, however I discussed with the patient that I believe there is a very low possibility of any acute injury. I discussed the plan to provide work restrictions of limited pushing and pulling of no more than 5 pounds as well as limited standing bending and twisting until he follows up with [facility]. An appointment is scheduled for tomorrow morning at 9:30 in the morning and patient is agreeable to the plan to follow-up. He is given a prescription for muscle relaxers and is advised to ice and rest.  He is agreeable to plan as discussed. PATIENT REFERRED TO:  No primary care provider on file. No primary physician on file. DISCHARGE MEDICATIONS:  New Prescriptions    CYCLOBENZAPRINE (FLEXERIL) 10 MG TABLET    Take 1 tablet by mouth 3 times daily as needed for Muscle spasms . Do not drive or operate heavy machinery while taking this medication.        Discontinued Medications    No medications on file       Current Discharge Medication List          JAIME Hill CNP    (Please note that portions of this note were completed with a voice recognition program. Efforts were made to edit the dictations but occasionally words are mis-transcribed.)          JAIME Hill CNP  10/13/22 3871

## 2022-10-13 NOTE — ED TRIAGE NOTES
Patient to room with c/o mid back pain, below bilateral shoulder blades, beginning this morning. States while working, approximately [employer] and pipes fell from a shelf hitting the patient's back. States he also twisted his back as the pipes fell on him.

## 2022-12-16 ENCOUNTER — HOSPITAL ENCOUNTER (EMERGENCY)
Age: 47
Discharge: HOME | End: 2022-12-16
Payer: OTHER GOVERNMENT

## 2022-12-16 VITALS
SYSTOLIC BLOOD PRESSURE: 156 MMHG | RESPIRATION RATE: 16 BRPM | HEART RATE: 78 BPM | OXYGEN SATURATION: 98 % | DIASTOLIC BLOOD PRESSURE: 87 MMHG | TEMPERATURE: 98.3 F

## 2022-12-16 DIAGNOSIS — M54.50 THORACOLUMBAR BACK PAIN: Primary | ICD-10-CM

## 2022-12-16 DIAGNOSIS — M54.6 THORACOLUMBAR BACK PAIN: Primary | ICD-10-CM

## 2022-12-16 PROCEDURE — 99213 OFFICE O/P EST LOW 20 MIN: CPT

## 2022-12-16 PROCEDURE — 99213 OFFICE O/P EST LOW 20 MIN: CPT | Performed by: NURSE PRACTITIONER

## 2022-12-16 RX ORDER — TIZANIDINE 4 MG/1
4 TABLET ORAL EVERY 6 HOURS PRN
Qty: 20 TABLET | Refills: 0 | Status: SHIPPED | OUTPATIENT
Start: 2022-12-16

## 2022-12-16 RX ORDER — PREDNISONE 20 MG/1
20 TABLET ORAL DAILY
Qty: 5 TABLET | Refills: 0 | Status: SHIPPED | OUTPATIENT
Start: 2022-12-16 | End: 2022-12-21

## 2022-12-16 RX ORDER — NAPROXEN 500 MG/1
500 TABLET ORAL 2 TIMES DAILY PRN
Qty: 28 TABLET | Refills: 0 | Status: SHIPPED | OUTPATIENT
Start: 2022-12-16

## 2022-12-16 ASSESSMENT — PAIN DESCRIPTION - FREQUENCY: FREQUENCY: CONTINUOUS

## 2022-12-16 ASSESSMENT — PAIN SCALES - GENERAL: PAINLEVEL_OUTOF10: 8

## 2022-12-16 ASSESSMENT — PAIN DESCRIPTION - PAIN TYPE: TYPE: ACUTE PAIN

## 2022-12-16 ASSESSMENT — ENCOUNTER SYMPTOMS
ABDOMINAL PAIN: 0
BACK PAIN: 1
SHORTNESS OF BREATH: 0
NAUSEA: 0
ABDOMINAL DISTENTION: 0
VOMITING: 0

## 2022-12-16 ASSESSMENT — PAIN DESCRIPTION - LOCATION: LOCATION: BACK

## 2022-12-16 ASSESSMENT — PAIN - FUNCTIONAL ASSESSMENT: PAIN_FUNCTIONAL_ASSESSMENT: 0-10

## 2022-12-16 NOTE — ED TRIAGE NOTES
Ariadne Calhoun arrives to room with complaint of  chronic back pain  symptoms started 6 months ago. Ariadne Calhoun states he has an upcoming mri at [facility] for evaluation of his pain. He states [facility] and [facility] Zachary Padilla for his pain over the last few days with no response from either.

## 2023-01-16 ENCOUNTER — TRANSCRIBE ORDERS (OUTPATIENT)
Dept: ADMINISTRATIVE | Age: 48
End: 2023-01-16

## 2023-01-16 DIAGNOSIS — M25.562 ACUTE PAIN OF LEFT KNEE: Primary | ICD-10-CM

## 2023-01-16 DIAGNOSIS — G89.29 OTHER CHRONIC PAIN: ICD-10-CM

## 2023-02-07 ENCOUNTER — HOSPITAL ENCOUNTER (EMERGENCY)
Age: 48
Discharge: HOME | End: 2023-02-07
Payer: OTHER GOVERNMENT

## 2023-02-07 VITALS
WEIGHT: 240 LBS | DIASTOLIC BLOOD PRESSURE: 92 MMHG | SYSTOLIC BLOOD PRESSURE: 179 MMHG | TEMPERATURE: 97.8 F | RESPIRATION RATE: 16 BRPM | HEART RATE: 89 BPM | BODY MASS INDEX: 36.49 KG/M2 | OXYGEN SATURATION: 98 %

## 2023-02-07 DIAGNOSIS — B02.9 HERPES ZOSTER WITHOUT COMPLICATION: Primary | ICD-10-CM

## 2023-02-07 PROCEDURE — 99213 OFFICE O/P EST LOW 20 MIN: CPT

## 2023-02-07 PROCEDURE — 99213 OFFICE O/P EST LOW 20 MIN: CPT | Performed by: NURSE PRACTITIONER

## 2023-02-07 RX ORDER — VALACYCLOVIR HYDROCHLORIDE 1 G/1
1000 TABLET, FILM COATED ORAL 3 TIMES DAILY
Qty: 21 TABLET | Refills: 0 | Status: SHIPPED | OUTPATIENT
Start: 2023-02-07 | End: 2023-02-14

## 2023-02-07 ASSESSMENT — ENCOUNTER SYMPTOMS
TROUBLE SWALLOWING: 0
EYE DISCHARGE: 0
EYE REDNESS: 0
SHORTNESS OF BREATH: 0
SORE THROAT: 0
VOMITING: 0
RHINORRHEA: 0
NAUSEA: 0
COUGH: 0
DIARRHEA: 0

## 2023-02-07 ASSESSMENT — PAIN - FUNCTIONAL ASSESSMENT: PAIN_FUNCTIONAL_ASSESSMENT: 0-10

## 2023-02-07 ASSESSMENT — PAIN SCALES - GENERAL: PAINLEVEL_OUTOF10: 3

## 2023-02-07 NOTE — ED TRIAGE NOTES
Pt states he is very fatigued and not feeling right.   Feels skin irritation on left side sensitive to touch

## 2023-02-07 NOTE — ED NOTES
advised to call back for any additional questions or concerns     Pt discharged in stable condition, ambulated to vehicle home by himself.          Aristides Villa LPN  29/37/99 0908

## 2023-02-07 NOTE — ED PROVIDER NOTES
[facility]  Urgent Care Encounter      CHIEF COMPLAINT       Chief Complaint   Patient presents with    Other     Skin burning/irritation (nothing seen)    Headache       Nurses Notes reviewed and I agree except as noted in the HPI. HISTORY OF PRESENT ILLNESS   Esther House is a 52 y.o. male who presents for evaluation of left side pain. Onset of symptoms yesterday, worsening. No injury. No recent URI. No chest pain, palpitations, shortness of breath. Medical history significant for diabetes. No treatment prior to arrival.    REVIEW OF SYSTEMS     Review of Systems   Constitutional:  Negative for chills, diaphoresis, fatigue and fever. HENT:  Negative for congestion, ear pain, rhinorrhea, sore throat and trouble swallowing. Eyes:  Negative for discharge and redness. Respiratory:  Negative for cough and shortness of breath. Cardiovascular:  Negative for chest pain. Gastrointestinal:  Negative for diarrhea, nausea and vomiting. Genitourinary:  Negative for decreased urine volume. Musculoskeletal:  Negative for neck pain and neck stiffness. Skin:  Negative for rash. Neurological:  Negative for headaches. Hematological:  Negative for adenopathy. Psychiatric/Behavioral:  Negative for sleep disturbance. PAST MEDICAL HISTORY         Diagnosis Date    Asthma     Diabetes mellitus ([address]     Essential hypertension 8/17/2016    Hyperlipidemia     Obesity (BMI 30.0-34. 9) 8/17/2016    Pneumonia        SURGICAL HISTORY     Patient  has a past surgical history that includes knee surgery and toenail excision.     CURRENT MEDICATIONS       Discharge Medication List as of 2/7/2023  3:52 PM        CONTINUE these medications which have NOT CHANGED    Details   tiZANidine (ZANAFLEX) 4 MG tablet Take 1 tablet by mouth every 6 hours as needed (muscle spasm), Disp-20 tablet, R-0Normal      naproxen (NAPROSYN) 500 MG tablet Take 1 tablet by mouth 2 times daily as needed for Pain, Disp-28 tablet, R-0Normal      LISINOPRIL PO Take by mouthHistorical Med      UNKNOWN TO PATIENT Historical Med      METFORMIN HCL ER PO Take by mouthHistorical Med      Elastic Bandages & Supports (KNEE BRACE/FLEX STAYS LARGE) MISC DAILY Starting 7/19/2016, Until Discontinued, Disp-1 each, R-0, Print             ALLERGIES     Patient is has No Known Allergies. FAMILY HISTORY     Patient'sfamily history includes Heart Disease in his father; High Blood Pressure in his mother. SOCIAL HISTORY     Patient  reports that he has never smoked. He has never used smokeless tobacco. He reports current alcohol use. He reports that he does not use drugs. PHYSICAL EXAM     ED TRIAGE VITALS  BP: (!) 179/92, Temp: 97.8 °F (36.6 °C), Heart Rate: 89, Resp: 16, SpO2: 98 %  Physical Exam  Vitals and nursing note reviewed. Constitutional:       General: He is not in acute distress. Appearance: Normal appearance. He is well-developed. He is not ill-appearing. HENT:      Head: Normocephalic and atraumatic. Right Ear: External ear normal.      Left Ear: External ear normal.      Nose: No congestion. Eyes:      General: No scleral icterus. Conjunctiva/sclera: Conjunctivae normal.   Pulmonary:      Effort: Pulmonary effort is normal. No respiratory distress. Musculoskeletal:      Cervical back: Normal range of motion. Skin:     General: Skin is warm and dry. Capillary Refill: Capillary refill takes less than 2 seconds. Coloration: Skin is not jaundiced. Findings: No rash. Neurological:      Mental Status: He is alert and oriented to person, place, and time. Sensory: Sensation is intact. Psychiatric:         Mood and Affect: Mood normal.         Behavior: Behavior normal. Behavior is cooperative. DIAGNOSTIC RESULTS   Labs:No results found for this visit on 02/07/23.     IMAGING:  No orders to display      URGENT CARE COURSE:     Vitals:    02/07/23 1536   BP: (!) 179/92 Pulse: 89   Resp: 16   Temp: 97.8 °F (36.6 °C)   SpO2: 98%   Weight: 240 lb (108.9 kg)       Medications - No data to display  PROCEDURES:  None  FINAL IMPRESSION      1. Herpes zoster without complication        DISPOSITION/PLAN   DISPOSITION Decision To Discharge 02/07/2023 03:50:16 PM    Patient presents left-sided pain, burning type sensation. Tenderness with slight touch to the area. Consistent with early shingles. Medication as prescribed. Follow-up with PCP as needed. PATIENT REFERRED TO:  [address] [place] [address],[address] [address] [facility] [address] [facility]    Establish care as needed. Medication as prescribed. Continue current medications. If any distress go to ER.     DISCHARGE MEDICATIONS:  Discharge Medication List as of 2/7/2023  3:52 PM        START taking these medications    Details   valACYclovir (VALTREX) 1 g tablet Take 1 tablet by mouth 3 times daily for 7 days, Disp-21 tablet, R-0Normal           Discharge Medication List as of 2/7/2023  3:52 PM          JAIME Jones CNP, APRN - CNP  02/07/23 1640

## 2023-02-07 NOTE — Clinical Note
Saira Quiroz was seen and treated in our emergency department on 2/7/2023. He may return to work on 02/09/2023. If you have any questions or concerns, please don't hesitate to call.       JAIME Tsai - CNP

## 2023-02-16 NOTE — ED PROVIDER NOTES
[facility]  Urgent Care Encounter      CHIEF COMPLAINT       Chief Complaint   Patient presents with    Back Pain       Nurses Notes reviewed and I agree except as noted in the HPI. HISTORY OF PRESENT ILLNESS   Elvia Ceja is a 52 y.o. male who presents for evaluation of acute on chronic right mid/lower back pain. Most recent onset over the last few days. No changes in activity level. No recent injury. Pain is sharp, stabbing. Pain is intermittent, rates 8/10. No arm weakness, numbness/tingling. No improvement current treatment. Patient scheduled to see orthopedics end of December. REVIEW OF SYSTEMS     Review of Systems   Constitutional:  Negative for chills, diaphoresis, fatigue and fever. Respiratory:  Negative for shortness of breath. Cardiovascular:  Negative for chest pain. Gastrointestinal:  Negative for abdominal distention, abdominal pain, nausea and vomiting. Genitourinary:  Negative for difficulty urinating. Musculoskeletal:  Positive for back pain. Neurological:  Negative for weakness. Hematological:  Does not bruise/bleed easily. PAST MEDICAL HISTORY         Diagnosis Date    Asthma     Diabetes mellitus ([facility] [address]     Essential hypertension 8/17/2016    Hyperlipidemia     Obesity (BMI 30.0-34. 9) 8/17/2016    Pneumonia        SURGICAL HISTORY     Patient  has a past surgical history that includes knee surgery and toenail excision. CURRENT MEDICATIONS       Discharge Medication List as of 12/16/2022  5:19 PM        CONTINUE these medications which have NOT CHANGED    Details   LISINOPRIL PO Take by mouthHistorical Med      UNKNOWN TO PATIENT Historical Med      METFORMIN HCL ER PO Take by mouthHistorical Med      Elastic Bandages & Supports (KNEE BRACE/FLEX STAYS LARGE) MISC DAILY Starting 7/19/2016, Until Discontinued, Disp-1 each, R-0, Print             ALLERGIES     Patient is has No Known Allergies.     FAMILY HISTORY     Patient'sfamily history includes Heart Disease in his father; High Blood Pressure in his mother. SOCIAL HISTORY     Patient  reports that he has never smoked. He has never used smokeless tobacco. He reports current alcohol use. He reports that he does not use drugs. PHYSICAL EXAM     ED TRIAGE VITALS  BP: (!) 156/87, Temp: 98.3 °F (36.8 °C), Heart Rate: 78, Resp: 16, SpO2: 98 %  Physical Exam  Vitals and nursing note reviewed. Constitutional:       General: He is not in acute distress. Appearance: Normal appearance. He is well-developed. He is not ill-appearing. HENT:      Head: Normocephalic and atraumatic. Right Ear: External ear normal.      Left Ear: External ear normal.      Nose: No congestion. Eyes:      General: No scleral icterus. Conjunctiva/sclera: Conjunctivae normal.   Pulmonary:      Effort: Pulmonary effort is normal. No respiratory distress. Musculoskeletal:      Cervical back: Normal and normal range of motion. Thoracic back: Tenderness present. No swelling, deformity or bony tenderness. Decreased range of motion. Lumbar back: Tenderness (right sided) present. No swelling, deformity or bony tenderness. Skin:     General: Skin is warm and dry. Capillary Refill: Capillary refill takes less than 2 seconds. Coloration: Skin is not jaundiced. Findings: No bruising, ecchymosis, erythema or rash. Neurological:      Mental Status: He is alert and oriented to person, place, and time. Sensory: Sensation is intact. Psychiatric:         Mood and Affect: Mood normal.         Behavior: Behavior normal. Behavior is cooperative. DIAGNOSTIC RESULTS   Labs:No results found for this visit on 12/16/22.     IMAGING:  No orders to display      URGENT CARE COURSE:     Vitals:    12/16/22 1622   BP: (!) 156/87   Pulse: 78   Resp: 16   Temp: 98.3 °F (36.8 °C)   TempSrc: Temporal   SpO2: 98%       Medications - No data to display  PROCEDURES:  None  FINAL IMPRESSION      1. Thoracolumbar back pain        DISPOSITION/PLAN   DISPOSITION Decision To Discharge 12/16/2022 05:16:54 PM    Nontoxic, no distress. Medications as prescribed. No cauda equina. Follow-up with Ortho. If worsening go to ER. PATIENT REFERRED TO:  [address] [facility] [address] [facility]. [address] [facility] [address]    Call to establish care. Medication as prescribed. Monitor glucose levels. Continue current treatment. If any distress go to ER.     DISCHARGE MEDICATIONS:  Discharge Medication List as of 12/16/2022  5:19 PM        START taking these medications    Details   tiZANidine (ZANAFLEX) 4 MG tablet Take 1 tablet by mouth every 6 hours as needed (muscle spasm), Disp-20 tablet, R-0Normal      predniSONE (DELTASONE) 20 MG tablet Take 1 tablet by mouth daily for 5 days, Disp-5 tablet, R-0Normal      naproxen (NAPROSYN) 500 MG tablet Take 1 tablet by mouth 2 times daily as needed for Pain, Disp-28 tablet, R-0Normal           Discharge Medication List as of 12/16/2022  5:19 PM          Louise Ledbetter, APRN - [address], APRN - CNP  12/16/22 7274 Detail Level: Detailed

## 2023-05-08 ENCOUNTER — HOSPITAL ENCOUNTER (EMERGENCY)
Age: 48
Discharge: HOME | End: 2023-05-08
Payer: OTHER GOVERNMENT

## 2023-05-08 ENCOUNTER — APPOINTMENT (OUTPATIENT)
Dept: GENERAL RADIOLOGY | Age: 48
End: 2023-05-08
Payer: OTHER GOVERNMENT

## 2023-05-08 VITALS
TEMPERATURE: 98 F | OXYGEN SATURATION: 96 % | RESPIRATION RATE: 16 BRPM | HEART RATE: 93 BPM | DIASTOLIC BLOOD PRESSURE: 107 MMHG | SYSTOLIC BLOOD PRESSURE: 168 MMHG

## 2023-05-08 DIAGNOSIS — S29.011A INTERCOSTAL MUSCLE STRAIN, INITIAL ENCOUNTER: Primary | ICD-10-CM

## 2023-05-08 PROCEDURE — 99213 OFFICE O/P EST LOW 20 MIN: CPT

## 2023-05-08 PROCEDURE — 99213 OFFICE O/P EST LOW 20 MIN: CPT | Performed by: NURSE PRACTITIONER

## 2023-05-08 PROCEDURE — 71101 X-RAY EXAM UNILAT RIBS/CHEST: CPT

## 2023-05-08 RX ORDER — ACETAMINOPHEN 325 MG/1
650 TABLET ORAL EVERY 6 HOURS PRN
Qty: 120 TABLET | Refills: 3 | COMMUNITY
Start: 2023-05-08

## 2023-05-08 ASSESSMENT — PAIN DESCRIPTION - PAIN TYPE: TYPE: ACUTE PAIN

## 2023-05-08 ASSESSMENT — PAIN DESCRIPTION - FREQUENCY: FREQUENCY: CONTINUOUS

## 2023-05-08 ASSESSMENT — ENCOUNTER SYMPTOMS
VOMITING: 0
ABDOMINAL PAIN: 0
BACK PAIN: 0
NAUSEA: 0
SHORTNESS OF BREATH: 0

## 2023-05-08 ASSESSMENT — PAIN - FUNCTIONAL ASSESSMENT: PAIN_FUNCTIONAL_ASSESSMENT: 0-10

## 2023-05-08 ASSESSMENT — PAIN DESCRIPTION - ORIENTATION: ORIENTATION: RIGHT

## 2023-05-08 ASSESSMENT — PAIN DESCRIPTION - DESCRIPTORS: DESCRIPTORS: SORE

## 2023-05-08 ASSESSMENT — PAIN SCALES - GENERAL: PAINLEVEL_OUTOF10: 6

## 2023-05-08 ASSESSMENT — PAIN DESCRIPTION - LOCATION: LOCATION: RIB CAGE

## 2023-05-08 NOTE — ED PROVIDER NOTES
[facility]       CHIEF COMPLAINT       Chief Complaint   Patient presents with    Abdominal Pain     Right upper quad rib pain leaning over car at home to work on  engine and felt a pop pain 6/10 constant 8 at times requesting  x ray       Nurses Notes reviewed and I agree except as noted in the HPI. HISTORY OF PRESENT ILLNESS   Kd Looney is a 52 y.o. 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 He reports rolling the right side of his ribs across his truck while leaning over causing a \"pop\" sound with associated pain rated 6 out of 10. Pain worsens with movement. Denies any shortness of breath or radiation of pain. Has not tried anything for treatment. Pain worsens with a deep breath. The history is provided by the patient. REVIEW OF SYSTEMS     Review of Systems   Constitutional:  Negative for activity change. Respiratory:  Negative for shortness of breath. Cardiovascular:  Negative for chest pain. Gastrointestinal:  Negative for abdominal pain, nausea and vomiting. Musculoskeletal:  Positive for myalgias (right chest). Negative for back pain. Neurological:  Negative for weakness. PAST MEDICAL HISTORY         Diagnosis Date    Asthma     Diabetes mellitus ([facility] [address]     Essential hypertension 8/17/2016    Hyperlipidemia     Obesity (BMI 30.0-34. 9) 8/17/2016    Pneumonia        SURGICALHISTORY     Patient  has a past surgical history that includes knee surgery and toenail excision.     CURRENT MEDICATIONS       Previous Medications    ELASTIC BANDAGES & SUPPORTS (KNEE BRACE/FLEX STAYS LARGE) MISC    1 Device by Does not apply route daily    LISINOPRIL PO    Take by mouth    METFORMIN HCL ER PO    Take by mouth    NAPROXEN (NAPROSYN) 500 MG TABLET    Take 1 tablet by mouth 2 times daily as needed for Pain    TIZANIDINE (ZANAFLEX) 4 MG TABLET    Take 1 tablet by

## 2023-06-20 ENCOUNTER — HOSPITAL ENCOUNTER (EMERGENCY)
Age: 48
Discharge: HOME | End: 2023-06-20
Payer: OTHER GOVERNMENT

## 2023-06-20 ENCOUNTER — APPOINTMENT (OUTPATIENT)
Dept: GENERAL RADIOLOGY | Age: 48
End: 2023-06-20
Payer: OTHER GOVERNMENT

## 2023-06-20 VITALS
BODY MASS INDEX: 35.16 KG/M2 | HEART RATE: 73 BPM | SYSTOLIC BLOOD PRESSURE: 162 MMHG | HEIGHT: 68 IN | WEIGHT: 232 LBS | TEMPERATURE: 97.9 F | OXYGEN SATURATION: 96 % | DIASTOLIC BLOOD PRESSURE: 94 MMHG | RESPIRATION RATE: 26 BRPM

## 2023-06-20 DIAGNOSIS — R03.0 ELEVATED BLOOD PRESSURE READING: Primary | ICD-10-CM

## 2023-06-20 DIAGNOSIS — R42 LIGHTHEADED: ICD-10-CM

## 2023-06-20 DIAGNOSIS — R73.9 HYPERGLYCEMIA: ICD-10-CM

## 2023-06-20 LAB
ANION GAP SERPL CALC-SCNC: 11 MEQ/L (ref 8–16)
BASOPHILS ABSOLUTE: 0.1 THOU/MM3 (ref 0–0.1)
BASOPHILS NFR BLD AUTO: 0.6 %
BUN SERPL-MCNC: 17 MG/DL (ref 7–22)
CALCIUM SERPL-MCNC: 9.1 MG/DL (ref 8.5–10.5)
CHLORIDE SERPL-SCNC: 98 MEQ/L (ref 98–111)
CO2 SERPL-SCNC: 23 MEQ/L (ref 23–33)
CREAT SERPL-MCNC: 1 MG/DL (ref 0.4–1.2)
D DIMER PPP IA.FEU-MCNC: 292 NG/ML FEU (ref 0–500)
DEPRECATED RDW RBC AUTO: 41.9 FL (ref 35–45)
EOSINOPHIL NFR BLD AUTO: 1 %
EOSINOPHILS ABSOLUTE: 0.1 THOU/MM3 (ref 0–0.4)
ERYTHROCYTE [DISTWIDTH] IN BLOOD BY AUTOMATED COUNT: 12.7 % (ref 11.5–14.5)
GFR SERPL CREATININE-BSD FRML MDRD: > 60 ML/MIN/1.73M2
GLUCOSE BLD STRIP.AUTO-MCNC: 216 MG/DL (ref 70–108)
GLUCOSE SERPL-MCNC: 309 MG/DL (ref 70–108)
HCT VFR BLD AUTO: 48 % (ref 42–52)
HGB BLD-MCNC: 16.5 GM/DL (ref 14–18)
IMM GRANULOCYTES # BLD AUTO: 0.03 THOU/MM3 (ref 0–0.07)
IMM GRANULOCYTES NFR BLD AUTO: 0.3 %
LYMPHOCYTES ABSOLUTE: 3 THOU/MM3 (ref 1–4.8)
LYMPHOCYTES NFR BLD AUTO: 31.4 %
MCH RBC QN AUTO: 31.1 PG (ref 26–33)
MCHC RBC AUTO-ENTMCNC: 34.4 GM/DL (ref 32.2–35.5)
MCV RBC AUTO: 90.6 FL (ref 80–94)
MONOCYTES ABSOLUTE: 0.7 THOU/MM3 (ref 0.4–1.3)
MONOCYTES NFR BLD AUTO: 7.1 %
NEUTROPHILS NFR BLD AUTO: 59.6 %
NRBC BLD AUTO-RTO: 0 /100 WBC
OSMOLALITY SERPL CALC.SUM OF ELEC: 277.8 MOSMOL/KG (ref 275–300)
PLATELET # BLD AUTO: 204 THOU/MM3 (ref 130–400)
PMV BLD AUTO: 11.8 FL (ref 9.4–12.4)
POTASSIUM SERPL-SCNC: 4.6 MEQ/L (ref 3.5–5.2)
RBC # BLD AUTO: 5.3 MILL/MM3 (ref 4.7–6.1)
SEGMENTED NEUTROPHILS ABSOLUTE COUNT: 5.6 THOU/MM3 (ref 1.8–7.7)
SODIUM SERPL-SCNC: 132 MEQ/L (ref 135–145)
TROPONIN T: < 0.01 NG/ML
WBC # BLD AUTO: 9.4 THOU/MM3 (ref 4.8–10.8)

## 2023-06-20 PROCEDURE — 99285 EMERGENCY DEPT VISIT HI MDM: CPT

## 2023-06-20 PROCEDURE — 84484 ASSAY OF TROPONIN QUANT: CPT

## 2023-06-20 PROCEDURE — 96361 HYDRATE IV INFUSION ADD-ON: CPT

## 2023-06-20 PROCEDURE — 82948 REAGENT STRIP/BLOOD GLUCOSE: CPT

## 2023-06-20 PROCEDURE — 2580000003 HC RX 258: Performed by: NURSE PRACTITIONER

## 2023-06-20 PROCEDURE — 96360 HYDRATION IV INFUSION INIT: CPT

## 2023-06-20 PROCEDURE — 71046 X-RAY EXAM CHEST 2 VIEWS: CPT

## 2023-06-20 PROCEDURE — 36415 COLL VENOUS BLD VENIPUNCTURE: CPT

## 2023-06-20 PROCEDURE — 80048 BASIC METABOLIC PNL TOTAL CA: CPT

## 2023-06-20 PROCEDURE — 93005 ELECTROCARDIOGRAM TRACING: CPT | Performed by: NURSE PRACTITIONER

## 2023-06-20 PROCEDURE — 85379 FIBRIN DEGRADATION QUANT: CPT

## 2023-06-20 PROCEDURE — 85025 COMPLETE CBC W/AUTO DIFF WBC: CPT

## 2023-06-20 RX ORDER — 0.9 % SODIUM CHLORIDE 0.9 %
1000 INTRAVENOUS SOLUTION INTRAVENOUS ONCE
Status: COMPLETED | OUTPATIENT
Start: 2023-06-20 | End: 2023-06-20

## 2023-06-20 RX ADMIN — SODIUM CHLORIDE 1000 ML: 9 INJECTION, SOLUTION INTRAVENOUS at 15:45

## 2023-06-20 NOTE — DISCHARGE INSTRUCTIONS
You need to see your doctor to get back on your medications. Follow up as directed. Return if you note any worsening concerns.   Follow up with your pcp or the family medicine clinic

## 2023-06-20 NOTE — ED PROVIDER NOTES
Triage Vitals [06/20/23 1445]   BP Temp Temp Source Pulse Respirations SpO2 Height Weight - Scale   (!) 177/97 97.9 °F (36.6 °C) Oral 95 16 96 % 5' 8\" (1.727 m) 232 lb (105.2 kg)       Physical Exam  Vitals and nursing note reviewed. Constitutional:       General: He is not in acute distress. Appearance: Normal appearance. He is well-developed. He is obese. He is not ill-appearing, toxic-appearing or diaphoretic. HENT:      Head: Normocephalic and atraumatic. Right Ear: Tympanic membrane, ear canal and external ear normal.      Left Ear: Tympanic membrane, ear canal and external ear normal.      Nose: Nose normal.      Mouth/Throat:      Mouth: Mucous membranes are moist.      Pharynx: Oropharynx is clear. Eyes:      General: No visual field deficit. Right eye: No discharge. Left eye: No discharge. Extraocular Movements: Extraocular movements intact. Conjunctiva/sclera: Conjunctivae normal.      Pupils: Pupils are equal, round, and reactive to light. Neck:      Trachea: No tracheal deviation. Cardiovascular:      Rate and Rhythm: Normal rate and regular rhythm. Pulses: Normal pulses. Heart sounds: Normal heart sounds. No murmur heard. No gallop. Comments: Normal capillary refill  Pulmonary:      Effort: Pulmonary effort is normal. No respiratory distress. Breath sounds: Normal breath sounds. No stridor. Abdominal:      General: Bowel sounds are normal.      Palpations: Abdomen is soft. Musculoskeletal:         General: No tenderness or deformity. Normal range of motion. Cervical back: Normal range of motion. Comments: No ttp calf muscle bilat, no erythema   Skin:     General: Skin is warm and dry. Capillary Refill: Capillary refill takes less than 2 seconds. Coloration: Skin is not pale. Findings: No erythema or rash. Neurological:      General: No focal deficit present.       Mental Status: He is alert and oriented to

## 2023-06-20 NOTE — ED NOTES
Pt to er. Pt states he was at work when he turned around and felt like he was going to pass out. States got really dizzy. States is a little dizzy now.  Denies all pain/.      Padmini Stallworth RN  06/20/23 7408

## 2023-06-20 NOTE — ED NOTES
Pt resting in bed. Resp regular. Denies all needs. Call light in reach.       Padmini Stallworth RN  06/20/23 1824

## 2023-06-20 NOTE — ED NOTES
Pt lying in bed. Resp regular. Denies all needs. Call light in reach.       Beatriz Delatorre RN  06/20/23 1156

## 2023-06-22 LAB
EKG ATRIAL RATE: 83 BPM
EKG P AXIS: 53 DEGREES
EKG P-R INTERVAL: 122 MS
EKG Q-T INTERVAL: 356 MS
EKG QRS DURATION: 76 MS
EKG QTC CALCULATION (BAZETT): 418 MS
EKG R AXIS: -4 DEGREES
EKG T AXIS: 25 DEGREES
EKG VENTRICULAR RATE: 83 BPM

## 2023-06-22 PROCEDURE — 93010 ELECTROCARDIOGRAM REPORT: CPT | Performed by: INTERNAL MEDICINE

## 2023-08-30 ENCOUNTER — HOSPITAL ENCOUNTER (EMERGENCY)
Age: 48
Discharge: HOME | End: 2023-08-30
Payer: OTHER GOVERNMENT

## 2023-08-30 VITALS
OXYGEN SATURATION: 96 % | BODY MASS INDEX: 34.86 KG/M2 | DIASTOLIC BLOOD PRESSURE: 83 MMHG | HEART RATE: 84 BPM | HEIGHT: 68 IN | WEIGHT: 230 LBS | SYSTOLIC BLOOD PRESSURE: 153 MMHG | TEMPERATURE: 98.7 F | RESPIRATION RATE: 18 BRPM

## 2023-08-30 DIAGNOSIS — S39.012A STRAIN OF LUMBAR REGION, INITIAL ENCOUNTER: Primary | ICD-10-CM

## 2023-08-30 PROCEDURE — 99213 OFFICE O/P EST LOW 20 MIN: CPT | Performed by: NURSE PRACTITIONER

## 2023-08-30 PROCEDURE — 99213 OFFICE O/P EST LOW 20 MIN: CPT

## 2023-08-30 RX ORDER — TIZANIDINE 4 MG/1
4 TABLET ORAL 4 TIMES DAILY PRN
Qty: 40 TABLET | Refills: 0 | Status: SHIPPED | OUTPATIENT
Start: 2023-08-30

## 2023-08-30 RX ORDER — PREDNISONE 20 MG/1
20 TABLET ORAL 2 TIMES DAILY
Qty: 10 TABLET | Refills: 0 | Status: SHIPPED | OUTPATIENT
Start: 2023-08-30 | End: 2023-09-04

## 2023-08-30 RX ORDER — KETOROLAC TROMETHAMINE 10 MG/1
10 TABLET, FILM COATED ORAL EVERY 6 HOURS PRN
Qty: 20 TABLET | Refills: 0 | Status: SHIPPED | OUTPATIENT
Start: 2023-08-30

## 2023-08-30 ASSESSMENT — ENCOUNTER SYMPTOMS
BACK PAIN: 1
SHORTNESS OF BREATH: 0
DIARRHEA: 0
RHINORRHEA: 0
COUGH: 0
CHEST TIGHTNESS: 0
VOMITING: 0
SORE THROAT: 0
NAUSEA: 0

## 2023-08-30 ASSESSMENT — PAIN DESCRIPTION - LOCATION: LOCATION: BACK

## 2023-08-30 ASSESSMENT — PAIN - FUNCTIONAL ASSESSMENT: PAIN_FUNCTIONAL_ASSESSMENT: 0-10

## 2023-08-30 ASSESSMENT — PAIN DESCRIPTION - ORIENTATION: ORIENTATION: LOWER;MID

## 2023-08-30 ASSESSMENT — PAIN SCALES - GENERAL: PAINLEVEL_OUTOF10: 6

## 2023-08-30 ASSESSMENT — PAIN DESCRIPTION - DESCRIPTORS: DESCRIPTORS: DULL

## 2023-08-30 NOTE — ED PROVIDER NOTES
[address]  Urgent Care Encounter       CHIEF COMPLAINT       Chief Complaint   Patient presents with    Back Pain     Mid lower       Nurses Notes reviewed and I agree except as noted in the HPI. HISTORY OF PRESENT ILLNESS   Uvaldo Dye is a 52 y.o. male who presents to the [facility] urgent care for evaluation of back pain. He reports the pain started 1 week ago. He denies using any medications at home for pain. He denies dysuria, urgency, or frequency. Denies hematuria. Denies fever or chills. Reports the pain is worse with movement. He denies strenuous activity or injury. 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 The history is provided by the patient. No  was used. REVIEW OF SYSTEMS     Review of Systems   Constitutional:  Negative for activity change, appetite change, chills, fatigue and fever. HENT:  Negative for ear discharge, ear pain, rhinorrhea and sore throat. Respiratory:  Negative for cough, chest tightness and shortness of breath. Cardiovascular:  Negative for chest pain. Gastrointestinal:  Negative for diarrhea, nausea and vomiting. Genitourinary:  Negative for dysuria. Musculoskeletal:  Positive for back pain. Skin:  Negative for rash. Allergic/Immunologic: Negative for environmental allergies and food allergies. Neurological:  Negative for dizziness and headaches. PAST MEDICAL HISTORY         Diagnosis Date    Asthma     Diabetes mellitus ([address])     Essential hypertension 8/17/2016    Hyperlipidemia     Obesity (BMI 30.0-34. 9) 8/17/2016    Pneumonia        SURGICALHISTORY     Patient  has a past surgical history that includes knee surgery and toenail excision.     CURRENT MEDICATIONS       Discharge Medication List as of 8/30/2023  7:08 PM        CONTINUE these medications which have NOT CHANGED    Details   acetaminophen (AMINOFEN) 325 MG tablet Take 2 tablets by mouth

## 2023-08-30 NOTE — ED NOTES
Pt with complaints of mid and lower back pain that started a week ago. Denies any injury. States he has not tried anything for pain. States has continued to get worse. Denies any urinary frequency, burning on urination or blood in his urine.      Leah Fuller LPN  03/66/33 3738

## 2023-09-12 ENCOUNTER — HOSPITAL ENCOUNTER (EMERGENCY)
Age: 48
Discharge: HOME | End: 2023-09-12
Payer: OTHER GOVERNMENT

## 2023-09-12 VITALS
BODY MASS INDEX: 34.67 KG/M2 | SYSTOLIC BLOOD PRESSURE: 164 MMHG | OXYGEN SATURATION: 96 % | DIASTOLIC BLOOD PRESSURE: 88 MMHG | RESPIRATION RATE: 18 BRPM | HEART RATE: 94 BPM | TEMPERATURE: 98 F | WEIGHT: 228 LBS

## 2023-09-12 DIAGNOSIS — S29.019A THORACIC MYOFASCIAL STRAIN, INITIAL ENCOUNTER: Primary | ICD-10-CM

## 2023-09-12 PROCEDURE — 99212 OFFICE O/P EST SF 10 MIN: CPT | Performed by: NURSE PRACTITIONER

## 2023-09-12 PROCEDURE — 99213 OFFICE O/P EST LOW 20 MIN: CPT

## 2023-09-12 ASSESSMENT — ENCOUNTER SYMPTOMS
NAUSEA: 0
VOMITING: 0
BACK PAIN: 1
SHORTNESS OF BREATH: 0

## 2023-09-12 ASSESSMENT — PAIN DESCRIPTION - LOCATION: LOCATION: BACK

## 2023-09-12 ASSESSMENT — PAIN DESCRIPTION - PAIN TYPE: TYPE: ACUTE PAIN

## 2023-09-12 ASSESSMENT — PAIN DESCRIPTION - ORIENTATION: ORIENTATION: MID;LOWER

## 2023-09-12 ASSESSMENT — PAIN SCALES - GENERAL: PAINLEVEL_OUTOF10: 5

## 2023-09-12 ASSESSMENT — PAIN DESCRIPTION - DESCRIPTORS: DESCRIPTORS: ACHING

## 2023-09-12 ASSESSMENT — PAIN DESCRIPTION - FREQUENCY: FREQUENCY: CONTINUOUS

## 2023-09-12 NOTE — ED TRIAGE NOTES
Patient to room with c/o reoccurring back pain beginning three weeks ago. Denies know injury. Requests work excuse.

## 2023-09-25 ENCOUNTER — HOSPITAL ENCOUNTER (EMERGENCY)
Age: 48
Discharge: HOME | End: 2023-09-25
Payer: OTHER GOVERNMENT

## 2023-09-25 VITALS
HEIGHT: 68 IN | HEART RATE: 72 BPM | TEMPERATURE: 97.8 F | RESPIRATION RATE: 16 BRPM | BODY MASS INDEX: 34.56 KG/M2 | OXYGEN SATURATION: 96 % | WEIGHT: 228 LBS | DIASTOLIC BLOOD PRESSURE: 95 MMHG | SYSTOLIC BLOOD PRESSURE: 157 MMHG

## 2023-09-25 DIAGNOSIS — T23.261A PARTIAL THICKNESS BURN OF BACK OF RIGHT HAND, INITIAL ENCOUNTER: Primary | ICD-10-CM

## 2023-09-25 PROCEDURE — 99213 OFFICE O/P EST LOW 20 MIN: CPT | Performed by: NURSE PRACTITIONER

## 2023-09-25 PROCEDURE — 99213 OFFICE O/P EST LOW 20 MIN: CPT

## 2023-09-25 RX ORDER — CEPHALEXIN 500 MG/1
500 CAPSULE ORAL 3 TIMES DAILY
Qty: 21 CAPSULE | Refills: 0 | Status: SHIPPED | OUTPATIENT
Start: 2023-09-25 | End: 2023-10-02

## 2023-09-25 ASSESSMENT — ENCOUNTER SYMPTOMS
NAUSEA: 0
COUGH: 0
SHORTNESS OF BREATH: 0
SORE THROAT: 0
VOMITING: 0

## 2023-09-25 ASSESSMENT — PAIN DESCRIPTION - ORIENTATION: ORIENTATION: RIGHT

## 2023-09-25 ASSESSMENT — PAIN - FUNCTIONAL ASSESSMENT: PAIN_FUNCTIONAL_ASSESSMENT: 0-10

## 2023-09-25 ASSESSMENT — PAIN SCALES - GENERAL: PAINLEVEL_OUTOF10: 2

## 2023-09-25 ASSESSMENT — PAIN DESCRIPTION - DESCRIPTORS: DESCRIPTORS: BURNING

## 2023-09-25 NOTE — ED PROVIDER NOTES
[address]  Urgent Care Encounter       CHIEF COMPLAINT       Chief Complaint   Patient presents with    Hand Burn     right       Nurses Notes reviewed and I agree except as noted in the HPI. HISTORY OF PRESENT ILLNESS   Worthy Space is a 52 y.o. male who presents for evaluation of a burn to the right hand/wrist that occurred 2 days ago while cooking breakfast.  States that grease dropped onto his hand and burned him. States that he has been using some Silvadene cream that he had leftover from a previous burn and has been keeping the wound clean and dry is much as possible. He denies any fever, chills, nausea, or vomiting he denies any numbness or tingling into the digits of the right hand. Patient states that his tetanus has been updated within the past 5 years. The history is provided by the patient. REVIEW OF SYSTEMS     Review of Systems   Constitutional:  Negative for chills and fever. HENT:  Negative for congestion and sore throat. Respiratory:  Negative for cough and shortness of breath. Cardiovascular:  Negative for chest pain. Gastrointestinal:  Negative for nausea and vomiting. Musculoskeletal:  Negative for arthralgias and joint swelling. Skin:  Positive for wound. Neurological:  Negative for headaches. PAST MEDICAL HISTORY         Diagnosis Date    Asthma     Diabetes mellitus ([address])     Essential hypertension 8/17/2016    Hyperlipidemia     Obesity (BMI 30.0-34. 9) 8/17/2016    Pneumonia        SURGICALHISTORY     Patient  has a past surgical history that includes knee surgery and toenail excision.     CURRENT MEDICATIONS       Previous Medications    ACETAMINOPHEN (AMINOFEN) 325 MG TABLET    Take 2 tablets by mouth every 6 hours as needed for Pain    ELASTIC BANDAGES & SUPPORTS (KNEE BRACE/FLEX STAYS LARGE) MISC    1 Device by Does not apply route daily    KETOROLAC (TORADOL) 10 MG TABLET    Take 1 tablet by mouth every 6 hours as needed for

## 2023-12-14 ENCOUNTER — APPOINTMENT (OUTPATIENT)
Dept: GENERAL RADIOLOGY | Age: 48
End: 2023-12-14
Payer: OTHER GOVERNMENT

## 2023-12-14 ENCOUNTER — HOSPITAL ENCOUNTER (EMERGENCY)
Age: 48
Discharge: HOME | End: 2023-12-14
Payer: OTHER GOVERNMENT

## 2023-12-14 VITALS
SYSTOLIC BLOOD PRESSURE: 142 MMHG | TEMPERATURE: 97.7 F | RESPIRATION RATE: 18 BRPM | BODY MASS INDEX: 34.86 KG/M2 | WEIGHT: 230 LBS | HEART RATE: 80 BPM | OXYGEN SATURATION: 96 % | HEIGHT: 68 IN | DIASTOLIC BLOOD PRESSURE: 105 MMHG

## 2023-12-14 DIAGNOSIS — S86.912A STRAIN OF LEFT KNEE, INITIAL ENCOUNTER: Primary | ICD-10-CM

## 2023-12-14 PROCEDURE — 99213 OFFICE O/P EST LOW 20 MIN: CPT | Performed by: NURSE PRACTITIONER

## 2023-12-14 PROCEDURE — 99213 OFFICE O/P EST LOW 20 MIN: CPT

## 2023-12-14 PROCEDURE — 73564 X-RAY EXAM KNEE 4 OR MORE: CPT

## 2023-12-14 ASSESSMENT — PAIN DESCRIPTION - ONSET: ONSET: ON-GOING

## 2023-12-14 ASSESSMENT — PAIN - FUNCTIONAL ASSESSMENT
PAIN_FUNCTIONAL_ASSESSMENT: 0-10
PAIN_FUNCTIONAL_ASSESSMENT: ACTIVITIES ARE NOT PREVENTED

## 2023-12-14 ASSESSMENT — PAIN DESCRIPTION - DESCRIPTORS: DESCRIPTORS: ACHING

## 2023-12-14 ASSESSMENT — PAIN DESCRIPTION - FREQUENCY: FREQUENCY: CONTINUOUS

## 2023-12-14 ASSESSMENT — PAIN SCALES - GENERAL: PAINLEVEL_OUTOF10: 5

## 2023-12-14 ASSESSMENT — PAIN DESCRIPTION - LOCATION: LOCATION: KNEE

## 2023-12-14 ASSESSMENT — PAIN DESCRIPTION - ORIENTATION: ORIENTATION: LEFT

## 2023-12-14 ASSESSMENT — PAIN DESCRIPTION - PAIN TYPE: TYPE: ACUTE PAIN

## 2023-12-14 NOTE — ED TRIAGE NOTES
To room with c/o left knee pain after stepping off a ladder 2 days ago. He felt a pop and has been limping ever since.

## 2024-01-11 ENCOUNTER — HOSPITAL ENCOUNTER (EMERGENCY)
Age: 49
Discharge: HOME | End: 2024-01-11
Payer: OTHER GOVERNMENT

## 2024-01-11 VITALS
RESPIRATION RATE: 16 BRPM | OXYGEN SATURATION: 97 % | BODY MASS INDEX: 36.58 KG/M2 | DIASTOLIC BLOOD PRESSURE: 95 MMHG | TEMPERATURE: 97.6 F | WEIGHT: 240.6 LBS | SYSTOLIC BLOOD PRESSURE: 147 MMHG | HEART RATE: 83 BPM

## 2024-01-11 DIAGNOSIS — B34.9 VIRAL ILLNESS: Primary | ICD-10-CM

## 2024-01-11 LAB
FLUAV AG SPEC QL: NEGATIVE
FLUBV AG SPEC QL: NEGATIVE
SARS-COV-2 RDRP RESP QL NAA+PROBE: NOT  DETECTED

## 2024-01-11 PROCEDURE — 87804 INFLUENZA ASSAY W/OPTIC: CPT

## 2024-01-11 PROCEDURE — 99212 OFFICE O/P EST SF 10 MIN: CPT

## 2024-01-11 PROCEDURE — 99213 OFFICE O/P EST LOW 20 MIN: CPT

## 2024-01-11 PROCEDURE — 87635 SARS-COV-2 COVID-19 AMP PRB: CPT

## 2024-01-11 ASSESSMENT — PAIN SCALES - GENERAL: PAINLEVEL_OUTOF10: 8

## 2024-01-11 ASSESSMENT — PAIN DESCRIPTION - FREQUENCY: FREQUENCY: CONTINUOUS

## 2024-01-11 ASSESSMENT — PAIN DESCRIPTION - PAIN TYPE: TYPE: ACUTE PAIN

## 2024-01-11 ASSESSMENT — PAIN DESCRIPTION - LOCATION: LOCATION: HEAD

## 2024-01-11 ASSESSMENT — PAIN DESCRIPTION - DESCRIPTORS: DESCRIPTORS: ACHING

## 2024-01-11 NOTE — ED TRIAGE NOTES
Pt to room 3 with his friend. He reports that he woke up at 0500 and had a headache and noticed body aches as well. He reports that he didn't take anything for the pain and decided that he needed to \"go get checked right away\".

## 2024-01-11 NOTE — ED PROVIDER NOTES
[facility] URGENT CARE  Urgent Care Encounter       CHIEF COMPLAINT       Chief Complaint   Patient presents with    Headache    Generalized Body Aches       Nurses Notes reviewed and I agree except as noted in the HPI.  HISTORY OF PRESENT ILLNESS   Axel Orozco is a 48 y.o. male who presents with complaints of headache and body aches that started this morning at 0500. Denies taking anything for symptoms. Wanted to come and be checked out right away. Denies being around anyone sick that he is aware of.     The history is provided by the patient.       REVIEW OF SYSTEMS     Review of Systems   Musculoskeletal:  Positive for myalgias.   Neurological:  Positive for headaches.   All other systems reviewed and are negative.      PAST MEDICAL HISTORY         Diagnosis Date    Asthma     Diabetes mellitus (HCC)     Essential hypertension 8/17/2016    Hyperlipidemia     Obesity (BMI 30.0-34.9) 8/17/2016    Pneumonia        SURGICALHISTORY     Patient  has a past surgical history that includes knee surgery and toenail excision.    CURRENT MEDICATIONS       Previous Medications    ACETAMINOPHEN (AMINOFEN) 325 MG TABLET    Take 2 tablets by mouth every 6 hours as needed for Pain    ELASTIC BANDAGES & SUPPORTS (KNEE BRACE/FLEX STAYS LARGE) MISC    1 Device by Does not apply route daily    LISINOPRIL PO    Take by mouth       ALLERGIES     Patient is has No Known Allergies.    Patients   There is no immunization history on file for this patient.    FAMILY HISTORY     Patient's family history includes Heart Disease in his father; High Blood Pressure in his mother.    SOCIAL HISTORY     Patient  reports that he has never smoked. He has never used smokeless tobacco. He reports current alcohol use. He reports that he does not use drugs.    PHYSICAL EXAM     ED TRIAGE VITALS  BP: (!) 147/95, Temp: 97.6 °F (36.4 °C), Pulse: 83, Respirations: 16, SpO2: 97 %,Estimated body mass index is 36.58 kg/m² as calculated from the

## 2024-01-28 ENCOUNTER — APPOINTMENT (OUTPATIENT)
Dept: CT IMAGING | Age: 49
End: 2024-01-28
Payer: OTHER GOVERNMENT

## 2024-01-28 ENCOUNTER — APPOINTMENT (OUTPATIENT)
Dept: GENERAL RADIOLOGY | Age: 49
End: 2024-01-28
Payer: OTHER GOVERNMENT

## 2024-01-28 ENCOUNTER — HOSPITAL ENCOUNTER (EMERGENCY)
Age: 49
Discharge: HOME | End: 2024-01-28
Attending: EMERGENCY MEDICINE
Payer: OTHER GOVERNMENT

## 2024-01-28 VITALS
RESPIRATION RATE: 25 BRPM | WEIGHT: 230 LBS | HEART RATE: 81 BPM | DIASTOLIC BLOOD PRESSURE: 90 MMHG | BODY MASS INDEX: 34.86 KG/M2 | SYSTOLIC BLOOD PRESSURE: 169 MMHG | OXYGEN SATURATION: 95 % | HEIGHT: 68 IN | TEMPERATURE: 97.6 F

## 2024-01-28 DIAGNOSIS — S09.90XA CLOSED HEAD INJURY, INITIAL ENCOUNTER: ICD-10-CM

## 2024-01-28 DIAGNOSIS — M54.2 NECK PAIN: ICD-10-CM
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
## 2024-01-28 LAB
ALBUMIN SERPL BCG-MCNC: 4.2 G/DL (ref 3.5–5.1)
ALP SERPL-CCNC: 117 U/L (ref 38–126)
ALT SERPL W/O P-5'-P-CCNC: 24 U/L (ref 11–66)
AMPHETAMINES UR QL SCN: NEGATIVE
ANION GAP SERPL CALC-SCNC: 12 MEQ/L (ref 8–16)
AST SERPL-CCNC: 22 U/L (ref 5–40)
BARBITURATES UR QL SCN: NEGATIVE
BASOPHILS ABSOLUTE: 0.1 THOU/MM3 (ref 0–0.1)
BASOPHILS NFR BLD AUTO: 0.6 %
BENZODIAZ UR QL SCN: NEGATIVE
BILIRUB CONJ SERPL-MCNC: < 0.2 MG/DL (ref 0–0.3)
BILIRUB SERPL-MCNC: 0.2 MG/DL (ref 0.3–1.2)
BILIRUB UR QL STRIP.AUTO: NEGATIVE
BUN SERPL-MCNC: 15 MG/DL (ref 7–22)
BZE UR QL SCN: NEGATIVE
CALCIUM SERPL-MCNC: 8.5 MG/DL (ref 8.5–10.5)
CANNABINOIDS UR QL SCN: NEGATIVE
CHARACTER UR: CLEAR
CHLORIDE SERPL-SCNC: 97 MEQ/L (ref 98–111)
CO2 SERPL-SCNC: 23 MEQ/L (ref 23–33)
COLOR: YELLOW
CREAT SERPL-MCNC: 0.8 MG/DL (ref 0.4–1.2)
DEPRECATED RDW RBC AUTO: 41 FL (ref 35–45)
EOSINOPHIL NFR BLD AUTO: 1.3 %
EOSINOPHILS ABSOLUTE: 0.1 THOU/MM3 (ref 0–0.4)
ERYTHROCYTE [DISTWIDTH] IN BLOOD BY AUTOMATED COUNT: 12.4 % (ref 11.5–14.5)
ETHANOL SERPL-MCNC: < 0.01 %
FENTANYL: NEGATIVE
GFR SERPL CREATININE-BSD FRML MDRD: > 60 ML/MIN/1.73M2
GLUCOSE SERPL-MCNC: 261 MG/DL (ref 70–108)
GLUCOSE UR QL STRIP.AUTO: >= 1000 MG/DL
HCT VFR BLD AUTO: 45.3 % (ref 42–52)
HGB BLD-MCNC: 16.2 GM/DL (ref 14–18)
HGB UR QL STRIP.AUTO: NEGATIVE
IMM GRANULOCYTES # BLD AUTO: 0.02 THOU/MM3 (ref 0–0.07)
IMM GRANULOCYTES NFR BLD AUTO: 0.2 %
KETONES UR QL STRIP.AUTO: ABNORMAL
LIPASE SERPL-CCNC: 39.3 U/L (ref 5.6–51.3)
LYMPHOCYTES ABSOLUTE: 2.7 THOU/MM3 (ref 1–4.8)
LYMPHOCYTES NFR BLD AUTO: 31.8 %
MAGNESIUM SERPL-MCNC: 1.9 MG/DL (ref 1.6–2.4)
MCH RBC QN AUTO: 32.1 PG (ref 26–33)
MCHC RBC AUTO-ENTMCNC: 35.8 GM/DL (ref 32.2–35.5)
MCV RBC AUTO: 89.9 FL (ref 80–94)
MONOCYTES ABSOLUTE: 0.6 THOU/MM3 (ref 0.4–1.3)
MONOCYTES NFR BLD AUTO: 7.4 %
NEUTROPHILS NFR BLD AUTO: 58.7 %
NITRITE UR QL STRIP: NEGATIVE
NRBC BLD AUTO-RTO: 0 /100 WBC
NT-PROBNP SERPL IA-MCNC: < 36 PG/ML (ref 0–124)
OPIATES UR QL SCN: NEGATIVE
OSMOLALITY SERPL CALC.SUM OF ELEC: 274.4 MOSMOL/KG (ref 275–300)
OXYCODONE: NEGATIVE
PCP UR QL SCN: NEGATIVE
PH UR STRIP.AUTO: 5.5 [PH] (ref 5–9)
PLATELET # BLD AUTO: 187 THOU/MM3 (ref 130–400)
PMV BLD AUTO: 12.3 FL (ref 9.4–12.4)
POTASSIUM SERPL-SCNC: 4.8 MEQ/L (ref 3.5–5.2)
PROT SERPL-MCNC: 7 G/DL (ref 6.1–8)
PROT UR STRIP.AUTO-MCNC: NEGATIVE MG/DL
RBC # BLD AUTO: 5.04 MILL/MM3 (ref 4.7–6.1)
REASON FOR REJECTION: NORMAL
REJECTED TEST: NORMAL
SEGMENTED NEUTROPHILS ABSOLUTE COUNT: 5 THOU/MM3 (ref 1.8–7.7)
SODIUM SERPL-SCNC: 132 MEQ/L (ref 135–145)
SP GR UR REFRACT.AUTO: > 1.03 (ref 1–1.03)
TROPONIN, HIGH SENSITIVITY: < 6 NG/L (ref 0–12)
TSH SERPL DL<=0.005 MIU/L-ACNC: 1.32 UIU/ML (ref 0.4–4.2)
UROBILINOGEN, URINE: 0.2 EU/DL (ref 0–1)
WBC # BLD AUTO: 8.5 THOU/MM3 (ref 4.8–10.8)
WBC #/AREA URNS HPF: NEGATIVE /[HPF]

## 2024-01-28 PROCEDURE — 85025 COMPLETE CBC W/AUTO DIFF WBC: CPT

## 2024-01-28 PROCEDURE — 83735 ASSAY OF MAGNESIUM: CPT

## 2024-01-28 PROCEDURE — 93005 ELECTROCARDIOGRAM TRACING: CPT | Performed by: EMERGENCY MEDICINE

## 2024-01-28 PROCEDURE — 72170 X-RAY EXAM OF PELVIS: CPT

## 2024-01-28 PROCEDURE — 84443 ASSAY THYROID STIM HORMONE: CPT

## 2024-01-28 PROCEDURE — 72125 CT NECK SPINE W/O DYE: CPT

## 2024-01-28 PROCEDURE — 6370000000 HC RX 637 (ALT 250 FOR IP): Performed by: STUDENT IN AN ORGANIZED HEALTH CARE EDUCATION/TRAINING PROGRAM

## 2024-01-28 PROCEDURE — 82248 BILIRUBIN DIRECT: CPT

## 2024-01-28 PROCEDURE — 71045 X-RAY EXAM CHEST 1 VIEW: CPT

## 2024-01-28 PROCEDURE — 83880 ASSAY OF NATRIURETIC PEPTIDE: CPT

## 2024-01-28 PROCEDURE — 36415 COLL VENOUS BLD VENIPUNCTURE: CPT

## 2024-01-28 PROCEDURE — 80053 COMPREHEN METABOLIC PANEL: CPT

## 2024-01-28 PROCEDURE — 99285 EMERGENCY DEPT VISIT HI MDM: CPT

## 2024-01-28 PROCEDURE — 80307 DRUG TEST PRSMV CHEM ANLYZR: CPT

## 2024-01-28 PROCEDURE — 82077 ASSAY SPEC XCP UR&BREATH IA: CPT

## 2024-01-28 PROCEDURE — 81003 URINALYSIS AUTO W/O SCOPE: CPT

## 2024-01-28 PROCEDURE — 70450 CT HEAD/BRAIN W/O DYE: CPT

## 2024-01-28 PROCEDURE — 84484 ASSAY OF TROPONIN QUANT: CPT

## 2024-01-28 PROCEDURE — 83690 ASSAY OF LIPASE: CPT

## 2024-01-28 RX ORDER — HYDROCODONE BITARTRATE AND ACETAMINOPHEN 5; 325 MG/1; MG/1
1 TABLET ORAL ONCE
Status: COMPLETED | OUTPATIENT
Start: 2024-01-28 | End: 2024-01-28

## 2024-01-28 RX ORDER — ONDANSETRON 4 MG/1
4 TABLET, ORALLY DISINTEGRATING ORAL ONCE
Status: COMPLETED | OUTPATIENT
Start: 2024-01-28 | End: 2024-01-28

## 2024-01-28 RX ORDER — OXYCODONE HYDROCHLORIDE AND ACETAMINOPHEN 5; 325 MG/1; MG/1
1 TABLET ORAL ONCE
Status: COMPLETED | OUTPATIENT
Start: 2024-01-28 | End: 2024-01-28

## 2024-01-28 RX ADMIN — OXYCODONE HYDROCHLORIDE AND ACETAMINOPHEN 1 TABLET: 5; 325 TABLET ORAL at 21:05

## 2024-01-28 RX ADMIN — HYDROCODONE BITARTRATE AND ACETAMINOPHEN 1 TABLET: 5; 325 TABLET ORAL at 19:57

## 2024-01-28 RX ADMIN — ONDANSETRON 4 MG: 4 TABLET, ORALLY DISINTEGRATING ORAL at 21:46

## 2024-01-28 ASSESSMENT — PAIN - FUNCTIONAL ASSESSMENT
PAIN_FUNCTIONAL_ASSESSMENT: 0-10
PAIN_FUNCTIONAL_ASSESSMENT: 0-10

## 2024-01-28 ASSESSMENT — PAIN SCALES - GENERAL
PAINLEVEL_OUTOF10: 7
PAINLEVEL_OUTOF10: 8
PAINLEVEL_OUTOF10: 7
PAINLEVEL_OUTOF10: 8

## 2024-01-28 NOTE — ED TRIAGE NOTES
Pt arrives via EMS for fall with head injury and LOC. Pt states he walked onto his wooden porch and slipped d/t it being wet. Pt denies feeling dizzy prior to fall. Family at bedside states pt was unconscious x 1 minute. Pt denies blood thinners. C-collar in place on arrival. Pt c/o pain along spine with palpation. IV established in left AC on arrival. Pt c/o pain to head, neck, tailbone and bilateral shoulders. Pt denies taking anything for pain PTA. EKG complete on arrival.

## 2024-01-29 LAB
EKG ATRIAL RATE: 85 BPM
EKG ATRIAL RATE: 89 BPM
EKG P AXIS: 46 DEGREES
EKG P AXIS: 58 DEGREES
EKG P-R INTERVAL: 138 MS
EKG P-R INTERVAL: 140 MS
EKG Q-T INTERVAL: 358 MS
EKG Q-T INTERVAL: 370 MS
EKG QRS DURATION: 74 MS
EKG QRS DURATION: 76 MS
EKG QTC CALCULATION (BAZETT): 435 MS
EKG QTC CALCULATION (BAZETT): 440 MS
EKG R AXIS: 12 DEGREES
EKG R AXIS: 19 DEGREES
EKG T AXIS: 27 DEGREES
EKG T AXIS: 40 DEGREES
EKG VENTRICULAR RATE: 85 BPM
EKG VENTRICULAR RATE: 89 BPM

## 2024-01-29 NOTE — ED PROVIDER NOTES
I performed a history and physical examination of the patient and discussed management with the resident. I reviewed the resident’s note and agree with the documented findings and plan of care. Any areas of disagreement are noted on the chart. I was personally present for the key portions of any procedures. I have documented in the chart those procedures where I was not present during the key portions. I have reviewed the emergency nurses triage note. I agree with the chief complaint, past medical history, past surgical history, allergies, medications, social and family history as documented unless otherwise noted below. Documentation of the HPI, Physical Exam and Medical Decision Making performed by medical students or scribes is based on my personal performance of the HPI, PE and MDM. For Phys Assistant/ Nurse Practitioner cases/documentation I have personally evaluated this patient and have completed at least one if not all key elements of the E/M (history, physical exam, and MDM). My findings are as noted below.    In other words, I personally saw and examined the patient I have reviewed and agreed with the resident findings including all diagnostic interpretations and treatment plans as written.  I was present for the key portion of any procedures performed and the inclusive time noted in any critical care statement.    Patient presents today for a fall loss of consciousness.  Patient was walking out onto his porch, slipped fell back hit his head.  Had a brief loss of consciousness.  Denies any dizziness or feelings of syncope prior to the fall.  Patient is now complaining of some mild back pain neck pain headache lightheadedness pain in the mid back to lower back and tailbone area.  Patient is awake alert and oriented.  Neurologically intact, hemodynamically stable, no other physical complaints at this time.    Normal sinus rhythm, normal axis, ventricular rate of 85 CA interval 138 QRS duration 76 QT interval 
collar    The results of pertinent diagnostic studies and exam findings were discussed with the patient/surrogate. The patient’s provisional diagnosis and plan of care were discussed with the patient and present family who expressed understanding. Medications were reviewed and indications and risks of medications were discussed with the patient/family present. Strict return precautions and follow up instructions were discussed with the patient prior to discharge, with which the patient agrees.          DISCHARGE PRESCRIPTIONS: (None if blank)  New Prescriptions    No medications on file         This transcription was electronically signed. Parts of this transcriptions may have been dictated by use of voice recognition software and electronically transcribed, and parts may have been transcribed with the assistance of an ED scribe. The transcription may contain errors not detected in proofreading.  Please refer to my supervising physician's documentation if my documentation differs.    Electronically Signed: Ramos Winn MD, 01/28/24, 9:39 PM

## 2024-01-29 NOTE — DISCHARGE INSTRUCTIONS
You are seen in the emergency room today after a fall.  CAT scans were performed which did not show any bleeding or broken bones in the head or neck.  Go to [facility] tomorrow to have your spine looked at by a specialist.  Return the emergency room immediately if you develop tingling in the extremities, inability to move your arms, difficulty with urination or if you have any other concerns

## 2024-01-29 NOTE — ED NOTES
Pt medicated per mar. VS  assessed. RR regular and unlabored. Visitors at bedside. Call light in reach.

## 2024-01-29 NOTE — ED NOTES
This RN in to apply aspen collar per orders. VS assessed. RR regular and unlabored. Call light in reach.

## 2024-02-18 ENCOUNTER — HOSPITAL ENCOUNTER (EMERGENCY)
Age: 49
Discharge: HOME | End: 2024-02-18
Payer: OTHER GOVERNMENT

## 2024-02-18 VITALS
DIASTOLIC BLOOD PRESSURE: 117 MMHG | HEART RATE: 91 BPM | RESPIRATION RATE: 16 BRPM | TEMPERATURE: 97.2 F | SYSTOLIC BLOOD PRESSURE: 163 MMHG | OXYGEN SATURATION: 94 %

## 2024-02-18 DIAGNOSIS — K08.89 PAIN, DENTAL: Primary | ICD-10-CM

## 2024-02-18 DIAGNOSIS — K02.9 DENTAL CARIES: ICD-10-CM

## 2024-02-18 PROCEDURE — 99213 OFFICE O/P EST LOW 20 MIN: CPT

## 2024-02-18 PROCEDURE — 99213 OFFICE O/P EST LOW 20 MIN: CPT | Performed by: NURSE PRACTITIONER

## 2024-02-18 RX ORDER — CLINDAMYCIN HYDROCHLORIDE 150 MG/1
150 CAPSULE ORAL 3 TIMES DAILY
Qty: 30 CAPSULE | Refills: 0 | Status: SHIPPED | OUTPATIENT
Start: 2024-02-18 | End: 2024-02-22

## 2024-02-18 NOTE — DISCHARGE INSTRUCTIONS
Recommend use over-the-counter ibuprofen, and adjunct with antibiotic.  Encourage establishment with dentist, as further dental treatments may be warranted.

## 2024-02-18 NOTE — ED NOTES
To [facility] with complaints of dental infection, right upper. States started about 3 days ago that he can tell. Has a plan to get some teeth pulled and a bridge.      Roz Collazo, RN  02/18/24 2131

## 2024-02-18 NOTE — ED PROVIDER NOTES
[facility] URGENT CARE  Urgent Care Encounter       CHIEF COMPLAINT       Chief Complaint   Patient presents with    Dental Pain       Nurses Notes reviewed and I agree except as noted in the HPI.  HISTORY OF PRESENT ILLNESS   Axel Orozco is a 48 y.o. male who presents     Patient is present in urgent care today with concerns of dental pain lingering for the last 3 days.  Patient shares that he believes pain may have been present longer, however he recently finished short duration of narcotics for ongoing knee pain.  He denies ever being a smoker.  Denies any recent injury to mouth.  Denies any known fevers.  He shares that he recently gained a dental plan through his insurance carrier, and plans to establish with dentist within this week        REVIEW OF SYSTEMS     Review of Systems   Constitutional:  Negative for chills, fatigue and fever.   HENT:  Positive for dental problem (multiple caries). Negative for congestion, postnasal drip and sore throat.    Gastrointestinal:  Negative for nausea and vomiting.       PAST MEDICAL HISTORY         Diagnosis Date    Asthma     Diabetes mellitus (HCC)     Essential hypertension 8/17/2016    Hyperlipidemia     Obesity (BMI 30.0-34.9) 8/17/2016    Pneumonia        SURGICALHISTORY     Patient  has a past surgical history that includes knee surgery and toenail excision.    CURRENT MEDICATIONS       Previous Medications    ACETAMINOPHEN (AMINOFEN) 325 MG TABLET    Take 2 tablets by mouth every 6 hours as needed for Pain    ELASTIC BANDAGES & SUPPORTS (KNEE BRACE/FLEX STAYS LARGE) MISC    1 Device by Does not apply route daily    LISINOPRIL PO    Take by mouth       ALLERGIES     Patient is has No Known Allergies.    Patients   There is no immunization history on file for this patient.    FAMILY HISTORY     Patient's family history includes Heart Disease in his father; High Blood Pressure in his mother.    SOCIAL HISTORY     Patient  reports that he has never  REFERRED TO:  No primary care provider on file.  No primary physician on file.      DISCHARGE MEDICATIONS:  New Prescriptions    CLINDAMYCIN (CLEOCIN) 150 MG CAPSULE    Take 1 capsule by mouth 3 times daily for 10 days       Discontinued Medications    No medications on file       Current Discharge Medication List          JAIME Navarro NP    (Please note that portions of this note were completed with a voice recognition program. Efforts were made to edit the dictations but occasionally words are mis-transcribed.)           Heide Jarquin APRN - NP  02/18/24 5142

## 2024-02-22 ENCOUNTER — HOSPITAL ENCOUNTER (EMERGENCY)
Age: 49
Discharge: HOME | End: 2024-02-22
Payer: OTHER GOVERNMENT

## 2024-02-22 VITALS
HEART RATE: 81 BPM | DIASTOLIC BLOOD PRESSURE: 105 MMHG | OXYGEN SATURATION: 96 % | WEIGHT: 230 LBS | RESPIRATION RATE: 16 BRPM | BODY MASS INDEX: 34.97 KG/M2 | SYSTOLIC BLOOD PRESSURE: 172 MMHG | TEMPERATURE: 98.5 F

## 2024-02-22 DIAGNOSIS — K08.89 PAIN, DENTAL: Primary | ICD-10-CM

## 2024-02-22 PROCEDURE — 99283 EMERGENCY DEPT VISIT LOW MDM: CPT

## 2024-02-22 RX ORDER — AMOXICILLIN AND CLAVULANATE POTASSIUM 875; 125 MG/1; MG/1
1 TABLET, FILM COATED ORAL 2 TIMES DAILY
Qty: 20 TABLET | Refills: 0 | Status: SHIPPED | OUTPATIENT
Start: 2024-02-22 | End: 2024-03-03

## 2024-02-22 ASSESSMENT — PAIN DESCRIPTION - LOCATION: LOCATION: TEETH

## 2024-02-22 ASSESSMENT — PAIN SCALES - GENERAL: PAINLEVEL_OUTOF10: 10

## 2024-02-22 NOTE — ED PROVIDER NOTES
Vaping Use    Vaping Use: Never used   Substance and Sexual Activity    Alcohol use: Yes     Comment: rare    Drug use: No    Sexual activity: Yes     Partners: Female       REVIEW OF SYSTEMS       A 10 point review of systems discussed the patient and the pertinent positives and names are listed in the HPI    Except as noted above the remainder of the review of systems was reviewed and is negative.   PHYSICAL EXAM    (up to 7 for level 4, 8 or more for level 5)     ED Triage Vitals   BP Temp Temp Source Pulse Respirations SpO2 Height Weight - Scale   02/22/24 1029 02/22/24 1027 02/22/24 1027 02/22/24 1027 02/22/24 1027 02/22/24 1027 -- 02/22/24 1027   (!) 172/105 98.5 °F (36.9 °C) Oral 81 16 96 %  104.3 kg (230 lb)       General: nontoxic appearing.  HEENT: Normocephalic/atraumatic.  Extraocular muscles are intact.  Multiple dental caries noted.  There is a tooth that was had significant decay in the right upper side.  There is no obvious identifiable abscess.  No trismus  Neck: Full range of motion.    Lungs: Clear to auscultation in all lung fields.  No retractions.  No respiratory distress.  Heart: Regular rate and rhythm.  Abdomen: Soft, nontender.   Extremities: Range of motion is full.    Neurologic: Alert and oriented.  Normal motor and sensory function.  Skin: Warm, dry, free of rashes  DIAGNOSTIC RESULTS     EKG: (none if blank)  All EKG's areinterpreted by the Emergency Department Physician who either signs or Co-signs this chart in the absence of a cardiologist.        RADIOLOGY: (none if blank)   Interpretationper the Radiologist below, if available at the time of this note:    No orders to display       LABS:  Labs Reviewed - No data to display    All other labs were within normal range or not returned as of this dictation.    EMERGENCY DEPARTMENT COURSE and Medical Decision Making:       Differential diagnosis:  Dental pain/dental infection/dental cavities/dental decay      MDM  /   Given history

## 2024-02-22 NOTE — ED NOTES
Pt presents to the ED with concerns of ongoing dental pain and infection since Sunday. He was given clindamycin that he has been taking and is set up to see a dentist tomorrow, but is here today with 10/10 pain. Patient also reports having high bp and has not taken his morning meds yet.

## 2024-02-25 ENCOUNTER — HOSPITAL ENCOUNTER (EMERGENCY)
Age: 49
Discharge: HOME | End: 2024-02-25
Attending: EMERGENCY MEDICINE
Payer: OTHER GOVERNMENT

## 2024-02-25 VITALS
SYSTOLIC BLOOD PRESSURE: 173 MMHG | BODY MASS INDEX: 33.65 KG/M2 | HEART RATE: 106 BPM | RESPIRATION RATE: 18 BRPM | TEMPERATURE: 98.7 F | OXYGEN SATURATION: 98 % | DIASTOLIC BLOOD PRESSURE: 101 MMHG | HEIGHT: 68 IN | WEIGHT: 222 LBS

## 2024-02-25 DIAGNOSIS — J10.1 INFLUENZA B: Primary | ICD-10-CM

## 2024-02-25 LAB
FLUAV RNA RESP QL NAA+PROBE: NOT DETECTED
FLUBV RNA RESP QL NAA+PROBE: DETECTED
SARS-COV-2 RNA RESP QL NAA+PROBE: NOT DETECTED

## 2024-02-25 PROCEDURE — 99284 EMERGENCY DEPT VISIT MOD MDM: CPT

## 2024-02-25 PROCEDURE — 96372 THER/PROPH/DIAG INJ SC/IM: CPT

## 2024-02-25 PROCEDURE — 87636 SARSCOV2 & INF A&B AMP PRB: CPT

## 2024-02-25 PROCEDURE — 6370000000 HC RX 637 (ALT 250 FOR IP): Performed by: EMERGENCY MEDICINE

## 2024-02-25 PROCEDURE — 6360000002 HC RX W HCPCS: Performed by: EMERGENCY MEDICINE

## 2024-02-25 RX ORDER — KETOROLAC TROMETHAMINE 30 MG/ML
30 INJECTION, SOLUTION INTRAMUSCULAR; INTRAVENOUS ONCE
Status: COMPLETED | OUTPATIENT
Start: 2024-02-25 | End: 2024-02-25

## 2024-02-25 RX ORDER — BENZONATATE 100 MG/1
100 CAPSULE ORAL ONCE
Status: COMPLETED | OUTPATIENT
Start: 2024-02-25 | End: 2024-02-25

## 2024-02-25 RX ORDER — GUAIFENESIN/DEXTROMETHORPHAN 100-10MG/5
5 SYRUP ORAL 3 TIMES DAILY PRN
Qty: 120 ML | Refills: 0 | Status: SHIPPED | OUTPATIENT
Start: 2024-02-25 | End: 2024-03-06

## 2024-02-25 RX ADMIN — BENZONATATE 100 MG: 100 CAPSULE ORAL at 14:11

## 2024-02-25 RX ADMIN — KETOROLAC TROMETHAMINE 30 MG: 30 INJECTION INTRAMUSCULAR; INTRAVENOUS at 14:12

## 2024-02-25 ASSESSMENT — PAIN DESCRIPTION - LOCATION: LOCATION: FACE

## 2024-02-25 ASSESSMENT — PAIN - FUNCTIONAL ASSESSMENT: PAIN_FUNCTIONAL_ASSESSMENT: 0-10

## 2024-02-25 ASSESSMENT — PAIN SCALES - GENERAL
PAINLEVEL_OUTOF10: 10
PAINLEVEL_OUTOF10: 10

## 2024-02-25 ASSESSMENT — PAIN DESCRIPTION - ORIENTATION
ORIENTATION: RIGHT
ORIENTATION: RIGHT

## 2024-02-25 ASSESSMENT — PAIN DESCRIPTION - DESCRIPTORS: DESCRIPTORS: ACHING

## 2024-02-25 ASSESSMENT — PAIN DESCRIPTION - PAIN TYPE: TYPE: ACUTE PAIN

## 2024-02-25 ASSESSMENT — PAIN DESCRIPTION - FREQUENCY: FREQUENCY: CONTINUOUS

## 2024-02-25 NOTE — ED TRIAGE NOTES
Complaining of right side sinus and \"behind eye\" pain that has been going on for approx 1 week. Pt states he is currently being treated with antibiotic for dental infection. Pt states he the pain has gotten worse and moved up the right side of his face. Complaining of non productive cough.

## 2024-02-25 NOTE — ED PROVIDER NOTES
less likely pneumonia    My imaging interpretation: (none if blank)    Decision Rules/Clinical Scores utilized in MDM:      CDC Social determinants of health considered to potentially effect treatment and/or disposition plan:    Healthy People 2030, U.S. Department of Health and Human Services, Office of Disease Prevention and Health Promotion.     Medical Commodities impacting treatment or disposition:  See below  Past Medical History:   Diagnosis Date    Asthma     Diabetes mellitus (HCC)     Essential hypertension 8/17/2016    Hyperlipidemia     Obesity (BMI 30.0-34.9) 8/17/2016    Pneumonia        Plan:   Influenza swab, COVID swab, chest x-ray, Tessalon and Toradol IM      PREVIOUS RECORDS   Patient has had approximately 10 visits to providers in the past year, most recently was seen for dental pain and dental caries, on February 18 was started on clindamycin, on February 22 Augmentin 10-day course was added to his antibiotic regimen.    Previous visit summary and patient history available on EMR and was reviewed.    History obtained from chart review and the patient.     Pertinent previous records reviewed:  previous notes, admissions and hospitalizations .  Code Status: Not addressed at time of initial evaluation and reviewed.    DIAGNOSTIC RESULTS       RADIOLOGY: non-plain film images(s) such as CT,Ultrasound and MRI are read by the radiologist.    No orders to display     [] Visualized and interpreted by me   [] Radiologist's Wet Read Report Reviewed   [] Discussed withRadiologist.    LABS:   Labs Reviewed   COVID-19 & INFLUENZA COMBO - Abnormal; Notable for the following components:       Result Value    INFLUENZA B DETECTED (*)     All other components within normal limits       (Any cultures that may have been sent were not resulted at the time of this patient visit)    ED Medications administered this visit:   Medications   benzonatate (TESSALON) capsule 100 mg (100 mg Oral Given 2/25/24 1411)

## 2024-04-01 ENCOUNTER — HOSPITAL ENCOUNTER (EMERGENCY)
Age: 49
Discharge: HOME | End: 2024-04-01
Payer: OTHER GOVERNMENT

## 2024-04-01 ENCOUNTER — APPOINTMENT (OUTPATIENT)
Dept: GENERAL RADIOLOGY | Age: 49
End: 2024-04-01
Payer: OTHER GOVERNMENT

## 2024-04-01 VITALS
OXYGEN SATURATION: 98 % | DIASTOLIC BLOOD PRESSURE: 84 MMHG | TEMPERATURE: 98 F | BODY MASS INDEX: 32.54 KG/M2 | SYSTOLIC BLOOD PRESSURE: 164 MMHG | RESPIRATION RATE: 20 BRPM | WEIGHT: 214 LBS | HEART RATE: 92 BPM

## 2024-04-01 DIAGNOSIS — M25.571 ACUTE RIGHT ANKLE PAIN: Primary | ICD-10-CM

## 2024-04-01 DIAGNOSIS — I10 ELEVATED BLOOD PRESSURE READING WITH DIAGNOSIS OF HYPERTENSION: ICD-10-CM

## 2024-04-01 PROCEDURE — 99213 OFFICE O/P EST LOW 20 MIN: CPT

## 2024-04-01 PROCEDURE — 73610 X-RAY EXAM OF ANKLE: CPT

## 2024-04-01 ASSESSMENT — PAIN DESCRIPTION - ORIENTATION: ORIENTATION: RIGHT

## 2024-04-01 ASSESSMENT — PAIN DESCRIPTION - FREQUENCY: FREQUENCY: CONTINUOUS

## 2024-04-01 ASSESSMENT — PAIN DESCRIPTION - LOCATION: LOCATION: ANKLE

## 2024-04-01 ASSESSMENT — PAIN DESCRIPTION - DESCRIPTORS: DESCRIPTORS: ACHING;DISCOMFORT

## 2024-04-01 ASSESSMENT — PAIN SCALES - GENERAL: PAINLEVEL_OUTOF10: 4

## 2024-04-01 ASSESSMENT — PAIN DESCRIPTION - PAIN TYPE: TYPE: ACUTE PAIN

## 2024-04-01 ASSESSMENT — PAIN - FUNCTIONAL ASSESSMENT: PAIN_FUNCTIONAL_ASSESSMENT: 0-10

## 2024-04-01 NOTE — DISCHARGE INSTRUCTIONS
Rest, Elevate, apply ice 15-20 minutes at a time.  Tylenol / Ibuprofen as needed for fever and or pain.  Follow up with PCP or [facility] if no improvement or sooner with worsening symptoms.

## 2024-04-01 NOTE — ED TRIAGE NOTES
Patient ambulated to room with c/o right ankle pain beginning yesterday. States, \"a brick I was walking on barley shifted, and twisted my foot.\" Minimal edema noted to right, outer foot noted.

## 2024-04-01 NOTE — ED PROVIDER NOTES
[facility] URGENT CARE  Urgent Care Encounter       CHIEF COMPLAINT       Chief Complaint   Patient presents with    Ankle Pain     Right         Nurses Notes reviewed and I agree except as noted in the HPI.  HISTORY OF PRESENT ILLNESS   Axel Orozco is a 48 y.o. male who presents with concerns of a right ankle injury. Reports yesterday was participating in an adult [employer] egg hunt, stepped on a brick and rolled his right ankle injury laterally. Reports has been elevating today, applied ice and took Tylenol yesterday for symptom management.     HPI    REVIEW OF SYSTEMS     Review of Systems   Musculoskeletal:  Positive for joint swelling (right ankle, laterally).        Right ankle, swelling, laterally    All other systems reviewed and are negative.      PAST MEDICAL HISTORY         Diagnosis Date    Asthma     Diabetes mellitus (HCC)     Essential hypertension 8/17/2016    Hyperlipidemia     Obesity (BMI 30.0-34.9) 8/17/2016    Pneumonia        SURGICALHISTORY     Patient  has a past surgical history that includes knee surgery and toenail excision.    CURRENT MEDICATIONS       Previous Medications    ACETAMINOPHEN (AMINOFEN) 325 MG TABLET    Take 2 tablets by mouth every 6 hours as needed for Pain    ELASTIC BANDAGES & SUPPORTS (KNEE BRACE/FLEX STAYS LARGE) MISC    1 Device by Does not apply route daily    ETODOLAC (LODINE) 300 MG CAPSULE    Take 1 capsule by mouth in the morning and 1 capsule at noon and 1 capsule in the evening.    LISINOPRIL PO    Take by mouth       ALLERGIES     Patient is has No Known Allergies.    Patients   There is no immunization history on file for this patient.    FAMILY HISTORY     Patient's family history includes Heart Disease in his father; High Blood Pressure in his mother.    SOCIAL HISTORY     Patient  reports that he has never smoked. He has never used smokeless tobacco. He reports current alcohol use. He reports that he does not use drugs.    PHYSICAL EXAM     ED

## 2024-05-17 ENCOUNTER — APPOINTMENT (OUTPATIENT)
Dept: GENERAL RADIOLOGY | Age: 49
End: 2024-05-17
Payer: OTHER GOVERNMENT

## 2024-05-17 ENCOUNTER — HOSPITAL ENCOUNTER (EMERGENCY)
Age: 49
Discharge: HOME | End: 2024-05-17
Payer: OTHER GOVERNMENT

## 2024-05-17 VITALS
TEMPERATURE: 98.1 F | DIASTOLIC BLOOD PRESSURE: 97 MMHG | WEIGHT: 220 LBS | SYSTOLIC BLOOD PRESSURE: 159 MMHG | OXYGEN SATURATION: 97 % | HEART RATE: 79 BPM | RESPIRATION RATE: 16 BRPM | HEIGHT: 68 IN | BODY MASS INDEX: 33.34 KG/M2

## 2024-05-17 DIAGNOSIS — J40 BRONCHITIS: Primary | ICD-10-CM

## 2024-05-17 PROCEDURE — 99213 OFFICE O/P EST LOW 20 MIN: CPT

## 2024-05-17 PROCEDURE — 71046 X-RAY EXAM CHEST 2 VIEWS: CPT

## 2024-05-17 RX ORDER — PREDNISONE 20 MG/1
20 TABLET ORAL 2 TIMES DAILY
Qty: 10 TABLET | Refills: 0 | Status: SHIPPED | OUTPATIENT
Start: 2024-05-17 | End: 2024-05-22

## 2024-05-17 RX ORDER — GUAIFENESIN, PSEUDOEPHEDRINE HYDROCHLORIDE 600; 60 MG/1; MG/1
1 TABLET, EXTENDED RELEASE ORAL EVERY 12 HOURS
Qty: 14 TABLET | Refills: 0 | Status: SHIPPED | OUTPATIENT
Start: 2024-05-17 | End: 2024-05-24

## 2024-05-17 RX ORDER — AMOXICILLIN AND CLAVULANATE POTASSIUM 875; 125 MG/1; MG/1
1 TABLET, FILM COATED ORAL 2 TIMES DAILY
Qty: 14 TABLET | Refills: 0 | Status: SHIPPED | OUTPATIENT
Start: 2024-05-17 | End: 2024-05-24

## 2024-05-17 ASSESSMENT — ENCOUNTER SYMPTOMS
COUGH: 1
NAUSEA: 0
TROUBLE SWALLOWING: 0
SHORTNESS OF BREATH: 0
SINUS PRESSURE: 1
SORE THROAT: 0
SINUS PAIN: 1
EYE REDNESS: 0
CHEST TIGHTNESS: 1
RHINORRHEA: 1
DIARRHEA: 0
EYE DISCHARGE: 0

## 2024-05-17 ASSESSMENT — PAIN - FUNCTIONAL ASSESSMENT: PAIN_FUNCTIONAL_ASSESSMENT: NONE - DENIES PAIN

## 2024-05-17 NOTE — DISCHARGE INSTRUCTIONS
prescription for prednisone, Mucinex D, and Augmentin.  Use over-the-counter Tylenol and Motrin for pain or fever.  Follow-up with PCP in 3 to 5 days worsening symptom.  Seek emergency department for severe shortness of breath or chest pain.

## 2024-05-17 NOTE — ED PROVIDER NOTES
[facility] URGENT CARE  Urgent Care Encounter      CHIEF COMPLAINT       Chief Complaint   Patient presents with    Fatigue    Cough    Nasal Congestion       Nurses Notes reviewed and I agree except as noted in the HPI.  HISTORY OF PRESENT ILLNESS   Axel Orozco is a 48 y.o. male who presents urgent care for evaluation cough, nasal congestion and fatigue.  Patient reports onset of symptoms past Sunday.  He reports symptoms kind of started as nasal congestion and a slight cough.  He marked it off to be just seasonal allergies.  He mentions as the week goes on symptoms just can steadily increase and get worse.  Orts he is having a lot of chest tightness, cough.  Does endorse in sweats and chills.  Denies any fevers as he reports he has not checked.  Today he just feels very weak.  Does endorse in a lot of sinus congestion.  And reports that he is now feeling dizzy.  Reports he has been taking allergy medication Mucinex and DayQuil for his symptoms.    REVIEW OF SYSTEMS     Review of Systems   Constitutional:  Positive for chills, diaphoresis and fatigue. Negative for fever.   HENT:  Positive for congestion, rhinorrhea, sinus pressure and sinus pain. Negative for ear pain, sore throat and trouble swallowing.    Eyes:  Negative for discharge and redness.   Respiratory:  Positive for cough and chest tightness. Negative for shortness of breath.    Cardiovascular:  Negative for chest pain.   Gastrointestinal:  Negative for diarrhea, nausea and vomiting.   Genitourinary:  Negative for decreased urine volume.   Musculoskeletal:  Negative for neck pain and neck stiffness.   Skin:  Negative for rash.   Neurological:  Positive for dizziness. Negative for headaches.   Hematological:  Negative for adenopathy.   Psychiatric/Behavioral:  Negative for sleep disturbance.        PAST MEDICAL HISTORY         Diagnosis Date    Asthma     Diabetes mellitus (HCC)     Essential hypertension 8/17/2016    Hyperlipidemia

## 2024-06-26 ENCOUNTER — APPOINTMENT (OUTPATIENT)
Dept: GENERAL RADIOLOGY | Age: 49
End: 2024-06-26
Payer: OTHER GOVERNMENT

## 2024-06-26 ENCOUNTER — HOSPITAL ENCOUNTER (EMERGENCY)
Age: 49
Discharge: HOME | End: 2024-06-26
Attending: EMERGENCY MEDICINE
Payer: OTHER GOVERNMENT

## 2024-06-26 VITALS
WEIGHT: 222 LBS | OXYGEN SATURATION: 95 % | TEMPERATURE: 97.8 F | RESPIRATION RATE: 23 BRPM | HEIGHT: 68 IN | BODY MASS INDEX: 33.65 KG/M2 | DIASTOLIC BLOOD PRESSURE: 84 MMHG | SYSTOLIC BLOOD PRESSURE: 148 MMHG | HEART RATE: 89 BPM

## 2024-06-26 DIAGNOSIS — R07.89 CHEST WALL PAIN: Primary | ICD-10-CM

## 2024-06-26 LAB
ALBUMIN SERPL BCG-MCNC: 4.9 G/DL (ref 3.5–5.1)
ALP SERPL-CCNC: 100 U/L (ref 38–126)
ALT SERPL W/O P-5'-P-CCNC: 55 U/L (ref 11–66)
ANION GAP SERPL CALC-SCNC: 16 MEQ/L (ref 8–16)
AST SERPL-CCNC: 42 U/L (ref 5–40)
BASOPHILS ABSOLUTE: 0.1 THOU/MM3 (ref 0–0.1)
BASOPHILS NFR BLD AUTO: 0.5 %
BILIRUB SERPL-MCNC: 0.6 MG/DL (ref 0.3–1.2)
BUN SERPL-MCNC: 14 MG/DL (ref 7–22)
CALCIUM SERPL-MCNC: 8.7 MG/DL (ref 8.5–10.5)
CHLORIDE SERPL-SCNC: 98 MEQ/L (ref 98–111)
CO2 SERPL-SCNC: 21 MEQ/L (ref 23–33)
CREAT SERPL-MCNC: 0.9 MG/DL (ref 0.4–1.2)
DEPRECATED RDW RBC AUTO: 40.4 FL (ref 35–45)
EKG ATRIAL RATE: 94 BPM
EKG P AXIS: 25 DEGREES
EKG P-R INTERVAL: 118 MS
EKG Q-T INTERVAL: 352 MS
EKG QRS DURATION: 72 MS
EKG QTC CALCULATION (BAZETT): 440 MS
EKG R AXIS: 12 DEGREES
EKG T AXIS: 39 DEGREES
EKG VENTRICULAR RATE: 94 BPM
EOSINOPHIL NFR BLD AUTO: 0.6 %
EOSINOPHILS ABSOLUTE: 0.1 THOU/MM3 (ref 0–0.4)
ERYTHROCYTE [DISTWIDTH] IN BLOOD BY AUTOMATED COUNT: 12.4 % (ref 11.5–14.5)
GFR SERPL CREATININE-BSD FRML MDRD: > 90 ML/MIN/1.73M2
GLUCOSE SERPL-MCNC: 218 MG/DL (ref 70–108)
HCT VFR BLD AUTO: 48.8 % (ref 42–52)
HGB BLD-MCNC: 16.9 GM/DL (ref 14–18)
IMM GRANULOCYTES # BLD AUTO: 0.04 THOU/MM3 (ref 0–0.07)
IMM GRANULOCYTES NFR BLD AUTO: 0.3 %
LYMPHOCYTES ABSOLUTE: 3.6 THOU/MM3 (ref 1–4.8)
LYMPHOCYTES NFR BLD AUTO: 31.1 %
MCH RBC QN AUTO: 30.8 PG (ref 26–33)
MCHC RBC AUTO-ENTMCNC: 34.6 GM/DL (ref 32.2–35.5)
MCV RBC AUTO: 89.1 FL (ref 80–94)
MONOCYTES ABSOLUTE: 1 THOU/MM3 (ref 0.4–1.3)
MONOCYTES NFR BLD AUTO: 8.3 %
NEUTROPHILS ABSOLUTE: 6.9 THOU/MM3 (ref 1.8–7.7)
NEUTROPHILS NFR BLD AUTO: 59.2 %
NRBC BLD AUTO-RTO: 0 /100 WBC
OSMOLALITY SERPL CALC.SUM OF ELEC: 277.2 MOSMOL/KG (ref 275–300)
PLATELET # BLD AUTO: 192 THOU/MM3 (ref 130–400)
PMV BLD AUTO: 12.3 FL (ref 9.4–12.4)
POTASSIUM SERPL-SCNC: 4 MEQ/L (ref 3.5–5.2)
PROT SERPL-MCNC: 7.9 G/DL (ref 6.1–8)
RBC # BLD AUTO: 5.48 MILL/MM3 (ref 4.7–6.1)
SODIUM SERPL-SCNC: 135 MEQ/L (ref 135–145)
TROPONIN, HIGH SENSITIVITY: 8 NG/L (ref 0–12)
TROPONIN, HIGH SENSITIVITY: 9 NG/L (ref 0–12)
WBC # BLD AUTO: 11.6 THOU/MM3 (ref 4.8–10.8)

## 2024-06-26 PROCEDURE — 80053 COMPREHEN METABOLIC PANEL: CPT

## 2024-06-26 PROCEDURE — 71045 X-RAY EXAM CHEST 1 VIEW: CPT

## 2024-06-26 PROCEDURE — 99285 EMERGENCY DEPT VISIT HI MDM: CPT

## 2024-06-26 PROCEDURE — 84484 ASSAY OF TROPONIN QUANT: CPT

## 2024-06-26 PROCEDURE — 85025 COMPLETE CBC W/AUTO DIFF WBC: CPT

## 2024-06-26 PROCEDURE — 6360000002 HC RX W HCPCS

## 2024-06-26 PROCEDURE — 96374 THER/PROPH/DIAG INJ IV PUSH: CPT

## 2024-06-26 PROCEDURE — 36415 COLL VENOUS BLD VENIPUNCTURE: CPT

## 2024-06-26 PROCEDURE — 93005 ELECTROCARDIOGRAM TRACING: CPT

## 2024-06-26 PROCEDURE — 6370000000 HC RX 637 (ALT 250 FOR IP)

## 2024-06-26 RX ORDER — ONDANSETRON 2 MG/ML
4 INJECTION INTRAMUSCULAR; INTRAVENOUS ONCE
Status: COMPLETED | OUTPATIENT
Start: 2024-06-26 | End: 2024-06-26

## 2024-06-26 RX ORDER — ONDANSETRON 4 MG/1
4 TABLET, ORALLY DISINTEGRATING ORAL 3 TIMES DAILY PRN
Qty: 21 TABLET | Refills: 0 | Status: SHIPPED | OUTPATIENT
Start: 2024-06-26

## 2024-06-26 RX ORDER — LIDOCAINE 4 G/G
1 PATCH TOPICAL DAILY
Qty: 30 PATCH | Refills: 0 | Status: SHIPPED | OUTPATIENT
Start: 2024-06-26 | End: 2024-07-26

## 2024-06-26 RX ORDER — LIDOCAINE 4 G/G
1 PATCH TOPICAL ONCE
Status: DISCONTINUED | OUTPATIENT
Start: 2024-06-26 | End: 2024-06-26 | Stop reason: HOSPADM

## 2024-06-26 RX ADMIN — ONDANSETRON 4 MG: 2 INJECTION INTRAMUSCULAR; INTRAVENOUS at 18:48

## 2024-06-26 ASSESSMENT — PAIN - FUNCTIONAL ASSESSMENT: PAIN_FUNCTIONAL_ASSESSMENT: NONE - DENIES PAIN

## 2024-06-26 NOTE — DISCHARGE INSTRUCTIONS
You were seen and evaluated in the emergency department today for chest pain after having forceful vomiting.  We believe that this caused a muscle strain of your intercostal muscles that are between your ribs.  Your chest x-ray, EKG and blood work were all stable.  Fill your prescription for lidocaine patches, apply to left side of chest for 12 hours at a time.  You may fill your prescription for Zofran to take as needed for nausea and vomiting up to 3 times a day.      Follow-up with your primary care provider for further evaluation of using Ozempic as this seems to be the most likely cause of your nausea and vomiting.  Return to emergency department anytime for acute or worrisome changes.

## 2024-06-26 NOTE — ED PROVIDER NOTES
results (none if blank):  Labs Reviewed   COMPREHENSIVE METABOLIC PANEL - Abnormal; Notable for the following components:       Result Value    Glucose 218 (*)     CO2 21 (*)     AST 42 (*)     All other components within normal limits   CBC WITH AUTO DIFFERENTIAL - Abnormal; Notable for the following components:    WBC 11.6 (*)     All other components within normal limits   TROPONIN   ANION GAP   OSMOLALITY   GLOMERULAR FILTRATION RATE, ESTIMATED   TROPONIN     All laboratory results are individually reviewed and interpreted by me in the clinical context of this patient.  See ED course below for results interpretation if applicable.  (Any cultures that may have been sent were not resulted at the time of this patient ED visit)      Radiologic studies results available at the moment of this note (None if blank):  XR CHEST PORTABLE   Final Result   No interval change since previous study dated 5/17/2024, no acute   cardiopulmonary disease..               **This report has been created using voice recognition software. It may contain   minor errors which are inherent in voice recognition technology.**      Electronically signed by Dr. Geronimo Foster        See ED course below for my interpretation if applicable.  All radiology images independently reviewed by me in the clinical context of this patient, in addition to interpretation provided by the radiologist.      EKG interpretation (none if blank):  EKG shows normal sinus rhythm at a rate of 94 bpm, P interval 118, QRS 72, QTc 440, no ST segment elevation or depression, no STEMI    All EKG results are individually reviewed and interpreted by me in the clinical context of this patient.  All EKGs are also interpreted by our Cardiology department, final interpretation may not be available as of the writing of this note.      MEDICAL DECISION MAKING   Initial Assessment Summary:   Patient 48-year-old male presenting to emergency department today for chest pain s/p dry

## 2024-06-26 NOTE — ED NOTES
Pt in bed, eyes open, respirations even and unlabored. Vital signs reassessed, pt assisted to bathroom. No further needs voiced.

## 2024-06-26 NOTE — ED TRIAGE NOTES
Pt presents to ED with chest pain that began at 1600. Pt states he was dry heaving after eating and after dry heaving his chest began to hurt. Pt denies any cardiac hx but does note he has hypertension. Pt also notes that he began taking ozempic for type 2 diabetes and weight loss two weeks ago and now he has a decreased appetite and when he does eat he feels nauseous. Pt denies nausea at this time, but states he has felt dizzy. Pt does note that he has been checking his sugars and they have been normal. EKG complete, pt denies any pain at this time.

## 2024-09-24 ENCOUNTER — HOSPITAL ENCOUNTER (EMERGENCY)
Age: 49
Discharge: HOME | End: 2024-09-24
Payer: OTHER GOVERNMENT

## 2024-09-24 VITALS
BODY MASS INDEX: 32.23 KG/M2 | HEART RATE: 84 BPM | TEMPERATURE: 98.9 F | DIASTOLIC BLOOD PRESSURE: 87 MMHG | WEIGHT: 212 LBS | RESPIRATION RATE: 20 BRPM | OXYGEN SATURATION: 97 % | SYSTOLIC BLOOD PRESSURE: 164 MMHG

## 2024-09-24 DIAGNOSIS — K04.7 DENTAL ABSCESS: Primary | ICD-10-CM

## 2024-09-24 DIAGNOSIS — K21.9 GASTROESOPHAGEAL REFLUX DISEASE WITHOUT ESOPHAGITIS: ICD-10-CM

## 2024-09-24 DIAGNOSIS — R19.7 DIARRHEA, UNSPECIFIED TYPE: ICD-10-CM

## 2024-09-24 PROCEDURE — 99213 OFFICE O/P EST LOW 20 MIN: CPT

## 2024-09-24 RX ORDER — SIMETHICONE 125 MG
125 TABLET,CHEWABLE ORAL EVERY 6 HOURS PRN
Qty: 60 TABLET | Refills: 3 | Status: SHIPPED | OUTPATIENT
Start: 2024-09-24

## 2024-09-24 RX ORDER — AMOXICILLIN 500 MG/1
500 CAPSULE ORAL 2 TIMES DAILY
Qty: 20 CAPSULE | Refills: 0 | Status: SHIPPED | OUTPATIENT
Start: 2024-09-24 | End: 2024-10-04

## 2024-09-24 ASSESSMENT — ENCOUNTER SYMPTOMS
DIARRHEA: 1
NAUSEA: 0
ABDOMINAL PAIN: 1
RHINORRHEA: 0
EYE DISCHARGE: 0
COUGH: 0
EYE REDNESS: 0
TROUBLE SWALLOWING: 0
SHORTNESS OF BREATH: 0
SORE THROAT: 0
VOMITING: 0

## 2024-09-24 ASSESSMENT — PAIN - FUNCTIONAL ASSESSMENT: PAIN_FUNCTIONAL_ASSESSMENT: NONE - DENIES PAIN

## 2024-09-24 NOTE — ED TRIAGE NOTES
Patient to room with c/o diarrhea and stomach ache beginning four days ago. Denies nausea or vomiting. Continues to drink oral fluids.

## 2024-09-24 NOTE — ED PROVIDER NOTES
[facility] URGENT CARE  Urgent Care Encounter      CHIEF COMPLAINT       Chief Complaint   Patient presents with    Diarrhea     Stomach ache       Nurses Notes reviewed and I agree except as noted in the HPI.  HISTORY OF PRESENT ILLNESS   Axel Orozco is a 48 y.o. male who presents urgent care for evaluation of diarrhea, stomachache and blister on gum.  Reports stomachache and diarrhea that began about 4 days ago.  Reports several episodes of diarrhea during the day.  Does endorse and some abdominal cramping.  Denies any nausea vomiting, body aches or fever.  Denies eating any new foods or foods from restaurants.  Reports he is able to eat and drink normally.  Reports he has been taking his omeprazole.  Reports he doubled up the other day as his acid reflux was severe.    Mentions he also has this blister on his gum above a bad tooth.  Denies any dental pain.  Reports he is scheduled to see his dentist to have teeth removed.  Reports he thinks may be the infection appears causing his diarrhea.    REVIEW OF SYSTEMS     Review of Systems   Constitutional:  Negative for chills, diaphoresis, fatigue and fever.   HENT:  Positive for dental problem. Negative for congestion, ear pain, rhinorrhea, sore throat and trouble swallowing.    Eyes:  Negative for discharge and redness.   Respiratory:  Negative for cough and shortness of breath.    Cardiovascular:  Negative for chest pain.   Gastrointestinal:  Positive for abdominal pain and diarrhea. Negative for nausea and vomiting.   Genitourinary:  Negative for decreased urine volume.   Musculoskeletal:  Negative for neck pain and neck stiffness.   Skin:  Negative for rash.   Neurological:  Negative for headaches.   Hematological:  Negative for adenopathy.   Psychiatric/Behavioral:  Negative for sleep disturbance.        PAST MEDICAL HISTORY         Diagnosis Date    Asthma     Diabetes mellitus (HCC)     Essential hypertension 8/17/2016    Hyperlipidemia

## 2024-10-04 ENCOUNTER — HOSPITAL ENCOUNTER (EMERGENCY)
Age: 49
Discharge: HOME | End: 2024-10-04
Payer: OTHER GOVERNMENT

## 2024-10-04 VITALS
RESPIRATION RATE: 16 BRPM | HEART RATE: 97 BPM | OXYGEN SATURATION: 97 % | SYSTOLIC BLOOD PRESSURE: 137 MMHG | DIASTOLIC BLOOD PRESSURE: 91 MMHG | TEMPERATURE: 98.3 F

## 2024-10-04 DIAGNOSIS — R51.9 ACUTE NONINTRACTABLE HEADACHE, UNSPECIFIED HEADACHE TYPE: Primary | ICD-10-CM

## 2024-10-04 PROCEDURE — 6360000002 HC RX W HCPCS: Performed by: NURSE PRACTITIONER

## 2024-10-04 PROCEDURE — 99213 OFFICE O/P EST LOW 20 MIN: CPT

## 2024-10-04 PROCEDURE — 96372 THER/PROPH/DIAG INJ SC/IM: CPT

## 2024-10-04 PROCEDURE — 99213 OFFICE O/P EST LOW 20 MIN: CPT | Performed by: NURSE PRACTITIONER

## 2024-10-04 RX ORDER — KETOROLAC TROMETHAMINE 30 MG/ML
60 INJECTION, SOLUTION INTRAMUSCULAR; INTRAVENOUS ONCE
Status: COMPLETED | OUTPATIENT
Start: 2024-10-04 | End: 2024-10-04

## 2024-10-04 RX ADMIN — KETOROLAC TROMETHAMINE 60 MG: 60 INJECTION, SOLUTION INTRAMUSCULAR at 17:28

## 2024-10-04 ASSESSMENT — PAIN DESCRIPTION - PAIN TYPE: TYPE: ACUTE PAIN

## 2024-10-04 ASSESSMENT — PAIN DESCRIPTION - ORIENTATION: ORIENTATION: RIGHT;ANTERIOR;POSTERIOR

## 2024-10-04 ASSESSMENT — PAIN DESCRIPTION - DESCRIPTORS
DESCRIPTORS: ACHING
DESCRIPTORS: ACHING

## 2024-10-04 ASSESSMENT — PAIN - FUNCTIONAL ASSESSMENT: PAIN_FUNCTIONAL_ASSESSMENT: 0-10

## 2024-10-04 ASSESSMENT — PAIN SCALES - GENERAL
PAINLEVEL_OUTOF10: 4
PAINLEVEL_OUTOF10: 9

## 2024-10-04 ASSESSMENT — PAIN DESCRIPTION - LOCATION
LOCATION: HEAD
LOCATION: HEAD

## 2024-10-04 NOTE — ED PROVIDER NOTES
.[facility] URGENT CARE  UrgentCare Encounter      CHIEFCOMPLAINT       Chief Complaint   Patient presents with    Headache     Onset 90 min ago  keeps getting worse pain  9/10       Nurses Notes reviewed and I agree except as noted in the HPI.  HISTORY OF PRESENT ILLNESS   Axel Orozco is a 48 y.o. male who presents to urgent care with complaints of a headache.  Symptom onset was about 90 minutes ago.  He states that it started gradually and then increased.  Reports that it started on the right side of his head and is now migrated to the left.  Denies nausea.  He denies vision changes.  He denies that this is the worst headache he has ever had.  He does have a history of migraines in the past.  His headache does feel similar.  Denies fever, bodies, chills.  Denies vomiting.  Denies thunderclap onset.    REVIEW OF SYSTEMS     Review of Systems   Neurological:  Positive for headaches.       PAST MEDICAL HISTORY         Diagnosis Date    Asthma     Diabetes mellitus (HCC)     Essential hypertension 8/17/2016    Hyperlipidemia     Obesity (BMI 30.0-34.9) 8/17/2016    Pneumonia        SURGICAL HISTORY     Patient  has a past surgical history that includes knee surgery and toenail excision.    CURRENT MEDICATIONS       Discharge Medication List as of 10/4/2024  6:02 PM        CONTINUE these medications which have NOT CHANGED    Details   simethicone (MYLICON) 125 MG chewable tablet Take 1 tablet by mouth every 6 hours as needed for Flatulence, Disp-60 tablet, R-3Normal      amoxicillin (AMOXIL) 500 MG capsule Take 1 capsule by mouth 2 times daily for 10 days, Disp-20 capsule, R-0Normal      ondansetron (ZOFRAN-ODT) 4 MG disintegrating tablet Take 1 tablet by mouth 3 times daily as needed for Nausea or Vomiting, Disp-21 tablet, R-0Normal      ALBUTEROL IN Inhale into the lungsHistorical Med      etodolac (LODINE) 300 MG capsule Take 1 capsule by mouth in the morning and 1 capsule at noon and 1 capsule in

## 2024-10-04 NOTE — DISCHARGE INSTRUCTIONS
Go home and rest.  Try to sleep.  Limit screen time.    Report to ER with new or worsening symptoms.

## 2024-11-05 ENCOUNTER — HOSPITAL ENCOUNTER (EMERGENCY)
Age: 49
Discharge: HOME | End: 2024-11-05
Payer: OTHER GOVERNMENT

## 2024-11-05 VITALS
HEART RATE: 84 BPM | OXYGEN SATURATION: 98 % | DIASTOLIC BLOOD PRESSURE: 103 MMHG | TEMPERATURE: 98.1 F | RESPIRATION RATE: 18 BRPM | SYSTOLIC BLOOD PRESSURE: 158 MMHG

## 2024-11-05 DIAGNOSIS — R19.7 DIARRHEA, UNSPECIFIED TYPE: Primary | ICD-10-CM

## 2024-11-05 PROCEDURE — 99213 OFFICE O/P EST LOW 20 MIN: CPT | Performed by: NURSE PRACTITIONER

## 2024-11-05 PROCEDURE — 99213 OFFICE O/P EST LOW 20 MIN: CPT

## 2024-11-05 ASSESSMENT — ENCOUNTER SYMPTOMS
SHORTNESS OF BREATH: 0
SORE THROAT: 0
BLOOD IN STOOL: 0
EYE DISCHARGE: 0
EYE REDNESS: 0
NAUSEA: 0
TROUBLE SWALLOWING: 0
COUGH: 0
VOMITING: 0
ABDOMINAL PAIN: 0
RHINORRHEA: 0
DIARRHEA: 1

## 2024-11-05 ASSESSMENT — PAIN - FUNCTIONAL ASSESSMENT: PAIN_FUNCTIONAL_ASSESSMENT: NONE - DENIES PAIN

## 2024-11-05 NOTE — ED PROVIDER NOTES
extrasystoles are present.     Chest Wall: PMI is not displaced.      Heart sounds: Normal heart sounds. No murmur heard.     No friction rub. No gallop.   Pulmonary:      Effort: Pulmonary effort is normal. No accessory muscle usage or respiratory distress.      Breath sounds: Normal breath sounds.   Abdominal:      General: There is no distension.   Musculoskeletal:      Cervical back: Normal range of motion and neck supple.   Lymphadenopathy:      Head:      Right side of head: No submental, submandibular, tonsillar, preauricular, posterior auricular or occipital adenopathy.      Left side of head: No submental, submandibular, tonsillar, preauricular, posterior auricular or occipital adenopathy.      Cervical: No cervical adenopathy.      Upper Body:      Right upper body: No supraclavicular adenopathy.      Left upper body: No supraclavicular adenopathy.   Skin:     General: Skin is warm and dry.      Coloration: Skin is not pale.      Findings: No rash.      Comments: Skin intact, warm and dry to touch, no rashes noted on exposed surfaces.   Neurological:      Mental Status: He is alert and oriented to person, place, and time. He is not disoriented.   Psychiatric:         Mood and Affect: Mood normal.         Behavior: Behavior is cooperative.         DIAGNOSTIC RESULTS   Labs:No results found for this visit on 11/05/24.    IMAGING:  No orders to display      URGENT CARE COURSE:     Vitals:    11/05/24 1737   BP: (!) 158/103   Pulse: 84   Resp: 18   Temp: 98.1 °F (36.7 °C)   TempSrc: Oral   SpO2: 98%       Medications - No data to display  PROCEDURES:  None  FINAL IMPRESSION      1. Diarrhea, unspecified type        DISPOSITION/PLAN   DISPOSITION Decision To Discharge 11/05/2024 05:50:43 PM         Nontoxic, no distress.  Patient presents for evaluation of nonspecific diarrhea.  Diarrhea likely secondary to Ozempic dosage change.  Oral rehydration discussed.  Advance diet as tolerated.  If symptoms worsen go to

## 2024-11-05 NOTE — ED TRIAGE NOTES
Having  gas and diarrhea for the last 2 weeks off and on, (started with a fever today, between 100 to 101 ), (recently increased his ozempic) taste like burping up rotten eggs, headache today, diarrhea 3 times this morning,  little more solid this afternoon, having much abd grumbling

## 2025-01-22 ENCOUNTER — APPOINTMENT (OUTPATIENT)
Dept: GENERAL RADIOLOGY | Age: 50
End: 2025-01-22
Payer: OTHER GOVERNMENT

## 2025-01-22 ENCOUNTER — HOSPITAL ENCOUNTER (EMERGENCY)
Age: 50
Discharge: HOME | End: 2025-01-22
Payer: OTHER GOVERNMENT

## 2025-01-22 VITALS
RESPIRATION RATE: 16 BRPM | DIASTOLIC BLOOD PRESSURE: 88 MMHG | OXYGEN SATURATION: 97 % | HEART RATE: 87 BPM | TEMPERATURE: 97.3 F | SYSTOLIC BLOOD PRESSURE: 156 MMHG

## 2025-01-22 DIAGNOSIS — S96.911A STRAIN OF RIGHT FOOT, INITIAL ENCOUNTER: Primary | ICD-10-CM

## 2025-01-22 PROCEDURE — 99213 OFFICE O/P EST LOW 20 MIN: CPT

## 2025-01-22 PROCEDURE — 73630 X-RAY EXAM OF FOOT: CPT

## 2025-01-22 PROCEDURE — 99212 OFFICE O/P EST SF 10 MIN: CPT

## 2025-01-22 NOTE — ED NOTES
To [facility] with complaints of right foot pain that he woke up with on Saturday. States that Friday he thinks he slipped on ice but had no pain then. Ambulatory to room     Roz Collazo RN  01/22/25 [id number]

## 2025-01-23 NOTE — ED PROVIDER NOTES
[facility] URGENT CARE  Urgent Care Encounter       CHIEF COMPLAINT       Chief Complaint   Patient presents with    Foot Pain       Nurses Notes reviewed and I agree except as noted in the HPI.  HISTORY OF PRESENT ILLNESS   Axel Orozco is a 49 y.o. male who presents with complaints of right foot pain. Pt reports that on Friday he slipped on ice and on Saturday began having pain on lateral aspect of foot. Pt reports using tylenol for symptoms. Pt reports pain in heel with bearing weight.     The history is provided by the patient.       REVIEW OF SYSTEMS     Review of Systems   Musculoskeletal:  Positive for arthralgias and gait problem.   All other systems reviewed and are negative.      PAST MEDICAL HISTORY         Diagnosis Date    Asthma     Diabetes mellitus (HCC)     Essential hypertension 8/17/2016    Hyperlipidemia     Obesity (BMI 30.0-34.9) 8/17/2016    Pneumonia        SURGICALHISTORY     Patient  has a past surgical history that includes knee surgery and toenail excision.    CURRENT MEDICATIONS       Previous Medications    ACETAMINOPHEN (AMINOFEN) 325 MG TABLET    Take 2 tablets by mouth every 6 hours as needed for Pain    ALBUTEROL IN    Inhale into the lungs    ELASTIC BANDAGES & SUPPORTS (KNEE BRACE/FLEX STAYS LARGE) MISC    1 Device by Does not apply route daily    LISINOPRIL PO    Take by mouth    SEMAGLUTIDE (OZEMPIC, 1 MG/DOSE, SC)    Inject into the skin       ALLERGIES     Patient is has No Known Allergies.    Patients   There is no immunization history on file for this patient.    FAMILY HISTORY     Patient's family history includes Heart Disease in his father; High Blood Pressure in his mother.    SOCIAL HISTORY     Patient  reports that he has never smoked. He has never used smokeless tobacco. He reports current alcohol use. He reports that he does not use drugs.    PHYSICAL EXAM     ED TRIAGE VITALS  BP: (!) 156/88, Temp: 97.3 °F (36.3 °C), Pulse: 87, Respirations: 16, SpO2: 97

## 2025-02-06 ENCOUNTER — HOSPITAL ENCOUNTER (EMERGENCY)
Age: 50
Discharge: HOME | End: 2025-02-06
Payer: OTHER GOVERNMENT

## 2025-02-06 VITALS
SYSTOLIC BLOOD PRESSURE: 160 MMHG | DIASTOLIC BLOOD PRESSURE: 92 MMHG | WEIGHT: 212 LBS | TEMPERATURE: 97.9 F | RESPIRATION RATE: 20 BRPM | HEART RATE: 93 BPM | OXYGEN SATURATION: 96 % | BODY MASS INDEX: 32.23 KG/M2

## 2025-02-06 DIAGNOSIS — J01.00 ACUTE MAXILLARY SINUSITIS, RECURRENCE NOT SPECIFIED: Primary | ICD-10-CM

## 2025-02-06 PROCEDURE — 99213 OFFICE O/P EST LOW 20 MIN: CPT | Performed by: NURSE PRACTITIONER

## 2025-02-06 PROCEDURE — 99213 OFFICE O/P EST LOW 20 MIN: CPT

## 2025-02-06 RX ORDER — AZELASTINE 1 MG/ML
1 SPRAY, METERED NASAL 2 TIMES DAILY
Qty: 30 ML | Refills: 0 | Status: SHIPPED | OUTPATIENT
Start: 2025-02-06

## 2025-02-06 RX ORDER — DEXTROMETHORPHAN HYDROBROMIDE AND PROMETHAZINE HYDROCHLORIDE 15; 6.25 MG/5ML; MG/5ML
5 SYRUP ORAL 4 TIMES DAILY PRN
Qty: 118 ML | Refills: 0 | Status: SHIPPED | OUTPATIENT
Start: 2025-02-06 | End: 2025-02-13

## 2025-02-06 ASSESSMENT — ENCOUNTER SYMPTOMS
SHORTNESS OF BREATH: 0
CHEST TIGHTNESS: 0
CHOKING: 0
SWOLLEN GLANDS: 0
SINUS PAIN: 0
STRIDOR: 0
COUGH: 1
WHEEZING: 0
RHINORRHEA: 1
APNEA: 0
SORE THROAT: 1

## 2025-02-06 ASSESSMENT — PAIN - FUNCTIONAL ASSESSMENT: PAIN_FUNCTIONAL_ASSESSMENT: NONE - DENIES PAIN

## 2025-02-06 NOTE — ED TRIAGE NOTES
Patient to room with c/o productive cough, generalized body aches, and intermittent fever beginning six days ago. Requests work excuse.

## 2025-02-06 NOTE — ED PROVIDER NOTES
[facility] URGENT CARE  Urgent Care Encounter      CHIEF COMPLAINT       Chief Complaint   Patient presents with    Cough     Generalized body aches, fever       Nurses Notes reviewed and I agree except as noted in the HPI.  HISTORY OFPRESENT ILLNESS   Axel Orozco is a 49 y.o.  The history is provided by the patient. No  was used.   Cold Symptoms  Presenting symptoms: congestion, cough, fatigue, rhinorrhea and sore throat    Presenting symptoms: no ear pain, no facial pain and no fever    Severity:  Severe  Onset quality:  Gradual  Duration:  1 week  Timing:  Constant  Progression:  Worsening  Chronicity:  New  Relieved by:  Nothing  Worsened by:  Certain positions  Ineffective treatments:  OTC medications  Associated symptoms: headaches    Associated symptoms: no arthralgias, no myalgias, no neck pain, no sinus pain, no sneezing, no swollen glands and no wheezing    Risk factors: not elderly, no chronic cardiac disease, no chronic kidney disease, no chronic respiratory disease, no diabetes mellitus, no immunosuppression, no recent illness, no recent travel and no sick contacts        REVIEW OF SYSTEMS     Review of Systems   Constitutional:  Positive for activity change, appetite change and fatigue. Negative for chills, diaphoresis and fever.   HENT:  Positive for congestion, postnasal drip, rhinorrhea and sore throat. Negative for ear pain, sinus pain and sneezing.    Respiratory:  Positive for cough. Negative for apnea, choking, chest tightness, shortness of breath, wheezing and stridor.    Cardiovascular:  Negative for chest pain, palpitations and leg swelling.   Musculoskeletal:  Negative for arthralgias, myalgias and neck pain.   Neurological:  Positive for headaches.       PAST MEDICAL HISTORY         Diagnosis Date    Asthma     Diabetes mellitus (HCC)     Essential hypertension 8/17/2016    Hyperlipidemia     Obesity (BMI 30.0-34.9) 8/17/2016    Pneumonia        SURGICAL HISTORY

## 2025-03-21 ENCOUNTER — HOSPITAL ENCOUNTER (EMERGENCY)
Age: 50
Discharge: HOME | End: 2025-03-21
Payer: COMMERCIAL

## 2025-03-21 ENCOUNTER — APPOINTMENT (OUTPATIENT)
Dept: GENERAL RADIOLOGY | Age: 50
End: 2025-03-21
Payer: COMMERCIAL

## 2025-03-21 VITALS
HEART RATE: 81 BPM | RESPIRATION RATE: 16 BRPM | TEMPERATURE: 98.6 F | WEIGHT: 200 LBS | BODY MASS INDEX: 30.31 KG/M2 | HEIGHT: 68 IN | SYSTOLIC BLOOD PRESSURE: 133 MMHG | DIASTOLIC BLOOD PRESSURE: 83 MMHG | OXYGEN SATURATION: 98 %

## 2025-03-21 DIAGNOSIS — S39.012A STRAIN OF LUMBAR REGION, INITIAL ENCOUNTER: ICD-10-CM

## 2025-03-21 DIAGNOSIS — M79.604 ACUTE LEG PAIN, RIGHT: ICD-10-CM
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
## 2025-03-21 PROCEDURE — 99214 OFFICE O/P EST MOD 30 MIN: CPT

## 2025-03-21 PROCEDURE — 73552 X-RAY EXAM OF FEMUR 2/>: CPT

## 2025-03-21 PROCEDURE — 99213 OFFICE O/P EST LOW 20 MIN: CPT

## 2025-03-21 PROCEDURE — 72100 X-RAY EXAM L-S SPINE 2/3 VWS: CPT

## 2025-03-21 RX ORDER — IBUPROFEN 200 MG
200 TABLET ORAL EVERY 6 HOURS PRN
COMMUNITY

## 2025-03-21 RX ORDER — PREDNISONE 20 MG/1
20 TABLET ORAL 2 TIMES DAILY
Qty: 10 TABLET | Refills: 0 | Status: SHIPPED | OUTPATIENT
Start: 2025-03-21 | End: 2025-03-26

## 2025-03-21 ASSESSMENT — PAIN DESCRIPTION - ORIENTATION: ORIENTATION: RIGHT;MID

## 2025-03-21 ASSESSMENT — PAIN DESCRIPTION - ONSET: ONSET: SUDDEN

## 2025-03-21 ASSESSMENT — PAIN DESCRIPTION - LOCATION: LOCATION: BACK

## 2025-03-21 ASSESSMENT — ENCOUNTER SYMPTOMS: BACK PAIN: 1

## 2025-03-21 ASSESSMENT — PAIN DESCRIPTION - FREQUENCY: FREQUENCY: CONTINUOUS

## 2025-03-21 ASSESSMENT — PAIN SCALES - GENERAL: PAINLEVEL_OUTOF10: 6

## 2025-03-21 ASSESSMENT — PAIN DESCRIPTION - DESCRIPTORS: DESCRIPTORS: ACHING;SORE

## 2025-03-21 ASSESSMENT — PAIN DESCRIPTION - PAIN TYPE: TYPE: ACUTE PAIN

## 2025-03-21 NOTE — DISCHARGE INSTRUCTIONS
Rest, rotate heat and ice.  Tylenol / Ibuprofen as needed for fever and or pain.  Follow up with PCP or [facility] in 3-5 days if no improvement or sooner with worsening symptoms.

## 2025-03-21 NOTE — ED PROVIDER NOTES
[facility] URGENT CARE  Urgent Care Encounter       CHIEF COMPLAINT       Chief Complaint   Patient presents with    Back Pain     Middle of back and down to right hip after falling off of a ladder at work. \"I hit a barrel on the way down\"     Leg Pain     Left calf hurts from possibly hitting it on something when falling off of a ladder       Nurses Notes reviewed and I agree except as noted in the HPI.  HISTORY OF PRESENT ILLNESS   Axel Orozco is a 49 y.o. male who presents with concerns of back and leg pain. Patient reports he fell off a ladder while at work yesterday evening. Patient reports fell about 4 feet. Patient reports middle of back down to right hip and thigh is painful. Patient denies any medication for pain management.   Patient denies hitting head, denies any LOC.     HPI    REVIEW OF SYSTEMS     Review of Systems   Constitutional:  Negative for fatigue and fever.   Musculoskeletal:  Positive for back pain.        Right hip and thigh pain   All other systems reviewed and are negative.      PAST MEDICAL HISTORY         Diagnosis Date    Asthma     Diabetes mellitus (HCC)     Essential hypertension 8/17/2016    Hyperlipidemia     Obesity (BMI 30.0-34.9) 8/17/2016    Pneumonia        SURGICALHISTORY     Patient  has a past surgical history that includes knee surgery and toenail excision.    CURRENT MEDICATIONS       Previous Medications    ACETAMINOPHEN (AMINOFEN) 325 MG TABLET    Take 2 tablets by mouth every 6 hours as needed for Pain    ALBUTEROL IN    Inhale into the lungs    AZELASTINE (ASTELIN) 0.1 % NASAL SPRAY    1 spray by Nasal route 2 times daily Use in each nostril as directed    BENZOCAINE-MENTHOL (CEPACOL EXTRA STRENGTH) 15-2.6 MG LOZG LOZENGE    Take 1 lozenge by mouth every 2 hours as needed for Sore Throat    ELASTIC BANDAGES & SUPPORTS (KNEE BRACE/FLEX STAYS LARGE) MISC    1 Device by Does not apply route daily    IBUPROFEN (ADVIL;MOTRIN) 200 MG TABLET    Take 1 tablet by mouth  concerns at this time.      PATIENT REFERRED TO:  No primary care provider on file.  No primary physician on file.      DISCHARGE MEDICATIONS:  New Prescriptions    PREDNISONE (DELTASONE) 20 MG TABLET    Take 1 tablet by mouth 2 times daily for 5 days       Discontinued Medications    No medications on file       Current Discharge Medication List          JAIME Solorzano CNP    (Please note that portions of this note were completed with a voice recognition program. Efforts were made to edit the dictations but occasionally words are mis-transcribed.)            Mackenzie Cash APRN - CNP  03/21/25 1550

## 2025-04-18 ENCOUNTER — HOSPITAL ENCOUNTER (OUTPATIENT)
Age: 50
Discharge: HOME | End: 2025-04-18
Payer: OTHER GOVERNMENT

## 2025-04-18 ENCOUNTER — HOSPITAL ENCOUNTER (OUTPATIENT)
Dept: GENERAL RADIOLOGY | Age: 50
Discharge: HOME | End: 2025-04-18
Payer: OTHER GOVERNMENT

## 2025-04-18 DIAGNOSIS — M25.562 PAIN IN JOINT OF LEFT KNEE: ICD-10-CM

## 2025-04-18 PROCEDURE — 73562 X-RAY EXAM OF KNEE 3: CPT

## 2025-07-09 ENCOUNTER — HOSPITAL ENCOUNTER (EMERGENCY)
Age: 50
Discharge: HOME | End: 2025-07-09
Payer: OTHER GOVERNMENT

## 2025-07-09 VITALS
DIASTOLIC BLOOD PRESSURE: 101 MMHG | RESPIRATION RATE: 16 BRPM | HEART RATE: 88 BPM | TEMPERATURE: 97.4 F | OXYGEN SATURATION: 96 % | BODY MASS INDEX: 30.31 KG/M2 | HEIGHT: 68 IN | WEIGHT: 200 LBS | SYSTOLIC BLOOD PRESSURE: 154 MMHG

## 2025-07-09 DIAGNOSIS — K04.7 DENTAL ABSCESS: ICD-10-CM

## 2025-07-09 DIAGNOSIS — K08.89 PAIN, DENTAL: Primary | ICD-10-CM

## 2025-07-09 PROCEDURE — 99213 OFFICE O/P EST LOW 20 MIN: CPT

## 2025-07-09 RX ORDER — LOSARTAN POTASSIUM 50 MG/1
50 TABLET ORAL DAILY
COMMUNITY
Start: 2025-04-19

## 2025-07-09 RX ORDER — CHLORHEXIDINE GLUCONATE ORAL RINSE 1.2 MG/ML
15 SOLUTION DENTAL 2 TIMES DAILY
Qty: 420 ML | Refills: 0 | Status: SHIPPED | OUTPATIENT
Start: 2025-07-09 | End: 2025-07-23

## 2025-07-09 ASSESSMENT — PAIN - FUNCTIONAL ASSESSMENT: PAIN_FUNCTIONAL_ASSESSMENT: 0-10

## 2025-07-09 ASSESSMENT — PAIN DESCRIPTION - ORIENTATION: ORIENTATION: RIGHT

## 2025-07-09 ASSESSMENT — PAIN DESCRIPTION - LOCATION: LOCATION: OTHER (COMMENT)

## 2025-07-09 ASSESSMENT — PAIN SCALES - GENERAL: PAINLEVEL_OUTOF10: 5

## 2025-07-09 ASSESSMENT — PAIN DESCRIPTION - DESCRIPTORS: DESCRIPTORS: ACHING

## 2025-07-09 NOTE — DISCHARGE INSTRUCTIONS
Medications as prescribed.  Warm salt water gargles.   Tylenol and Ibuprofen as needed for pain.   Call for sooner appt or to be placed on cancellation list, otherwise keep dental appointment as scheduled.

## 2025-07-09 NOTE — ED TRIAGE NOTES
Pt c/o rral gum swelling right upper tooth x 4 days and woke up with blister on gum (will see dentist next month)

## 2025-07-09 NOTE — ED PROVIDER NOTES
[facility] URGENT CARE  Urgent Care Encounter       CHIEF COMPLAINT       Chief Complaint   Patient presents with    Oral Swelling     Oral gum swelling right upper tooth x 4 days and woke up with blister on gum (will see dentist next month)       Nurses Notes reviewed and I agree except as noted in the HPI.  HISTORY OF PRESENT ILLNESS   Axel Orozco is a 49 y.o. male who presents with concerns of right upper tooth pain and swelling for the past four days. Patient reports his dentist appointment isn't until next month. Patient reports use of Tylenol for symptom management.     HPI    REVIEW OF SYSTEMS     Review of Systems   Constitutional:  Negative for fever.   HENT:  Positive for dental problem.    All other systems reviewed and are negative.      PAST MEDICAL HISTORY         Diagnosis Date    Asthma     Diabetes mellitus (HCC)     Essential hypertension 8/17/2016    Hyperlipidemia     Obesity (BMI 30.0-34.9) 8/17/2016    Pneumonia        SURGICALHISTORY     Patient  has a past surgical history that includes knee surgery and toenail excision.    CURRENT MEDICATIONS       Previous Medications    ACETAMINOPHEN (AMINOFEN) 325 MG TABLET    Take 2 tablets by mouth every 6 hours as needed for Pain    ALBUTEROL IN    Inhale into the lungs    AZELASTINE (ASTELIN) 0.1 % NASAL SPRAY    1 spray by Nasal route 2 times daily Use in each nostril as directed    BENZOCAINE-MENTHOL (CEPACOL EXTRA STRENGTH) 15-2.6 MG LOZG LOZENGE    Take 1 lozenge by mouth every 2 hours as needed for Sore Throat    ELASTIC BANDAGES & SUPPORTS (KNEE BRACE/FLEX STAYS LARGE) MISC    1 Device by Does not apply route daily    IBUPROFEN (ADVIL;MOTRIN) 200 MG TABLET    Take 1 tablet by mouth every 6 hours as needed for Pain    LISINOPRIL PO    Take by mouth    LOSARTAN (COZAAR) 50 MG TABLET    Take 1 tablet by mouth daily    SEMAGLUTIDE (OZEMPIC, 1 MG/DOSE, SC)    Inject into the skin       ALLERGIES     Patient is has no known  allergies.    Patients   There is no immunization history on file for this patient.    FAMILY HISTORY     Patient's family history includes Heart Disease in his father; High Blood Pressure in his mother.    SOCIAL HISTORY     Patient  reports that he has never smoked. He has never used smokeless tobacco. He reports current alcohol use. He reports that he does not use drugs.    PHYSICAL EXAM     ED TRIAGE VITALS  BP: (!) 154/101 (pt just started new BP med denies chest pain or SOB), Temp: 97.4 °F (36.3 °C), Pulse: 88, Respirations: 16, SpO2: 96 %,Estimated body mass index is 30.41 kg/m² as calculated from the following:    Height as of this encounter: 1.727 m (5' 8\").    Weight as of this encounter: 90.7 kg (200 lb).,No LMP for male patient.    Physical Exam  Vitals and nursing note reviewed.   Constitutional:       General: He is not in acute distress.     Appearance: Normal appearance. He is not ill-appearing, toxic-appearing or diaphoretic.   HENT:      Mouth/Throat:      Dentition: Dental tenderness, gingival swelling, dental caries and dental abscesses present.     Eyes:      Pupils: Pupils are equal, round, and reactive to light.   Cardiovascular:      Rate and Rhythm: Normal rate and regular rhythm.      Heart sounds: Normal heart sounds.   Pulmonary:      Effort: Pulmonary effort is normal.      Breath sounds: Normal breath sounds.   Skin:     General: Skin is warm and dry.      Capillary Refill: Capillary refill takes less than 2 seconds.   Neurological:      General: No focal deficit present.      Mental Status: He is alert.   Psychiatric:         Mood and Affect: Mood normal.         Behavior: Behavior is cooperative.         DIAGNOSTIC RESULTS     Labs:No results found for this visit on 07/09/25.    IMAGING:    No orders to display         EKG:      URGENT CARE COURSE:     Vitals:    07/09/25 1044   BP: (!) 154/101   Pulse: 88   Resp: 16   Temp: 97.4 °F (36.3 °C)   TempSrc: Temporal   SpO2: 96%   Weight:

## 2025-07-15 ENCOUNTER — HOSPITAL ENCOUNTER (EMERGENCY)
Age: 50
Discharge: HOME | End: 2025-07-15
Payer: OTHER GOVERNMENT

## 2025-07-15 ENCOUNTER — APPOINTMENT (OUTPATIENT)
Dept: GENERAL RADIOLOGY | Age: 50
End: 2025-07-15
Payer: OTHER GOVERNMENT

## 2025-07-15 VITALS
RESPIRATION RATE: 20 BRPM | OXYGEN SATURATION: 97 % | WEIGHT: 200 LBS | DIASTOLIC BLOOD PRESSURE: 82 MMHG | BODY MASS INDEX: 30.41 KG/M2 | HEART RATE: 79 BPM | SYSTOLIC BLOOD PRESSURE: 149 MMHG | TEMPERATURE: 97.8 F

## 2025-07-15 DIAGNOSIS — S29.019A THORACIC MYOFASCIAL STRAIN, INITIAL ENCOUNTER: Primary | ICD-10-CM

## 2025-07-15 DIAGNOSIS — M40.204 KYPHOSIS OF THORACIC REGION, UNSPECIFIED KYPHOSIS TYPE: ICD-10-CM

## 2025-07-15 PROCEDURE — 72072 X-RAY EXAM THORAC SPINE 3VWS: CPT

## 2025-07-15 PROCEDURE — 99213 OFFICE O/P EST LOW 20 MIN: CPT

## 2025-07-15 PROCEDURE — 99213 OFFICE O/P EST LOW 20 MIN: CPT | Performed by: NURSE PRACTITIONER

## 2025-07-15 RX ORDER — LIDOCAINE 50 MG/G
1 PATCH TOPICAL DAILY
Qty: 10 PATCH | Refills: 0 | Status: SHIPPED | OUTPATIENT
Start: 2025-07-15 | End: 2025-07-25

## 2025-07-15 RX ORDER — IBUPROFEN 400 MG/1
400 TABLET, FILM COATED ORAL EVERY 6 HOURS PRN
Qty: 120 TABLET | Refills: 0 | Status: SHIPPED | OUTPATIENT
Start: 2025-07-15

## 2025-07-15 ASSESSMENT — ENCOUNTER SYMPTOMS
ABDOMINAL PAIN: 0
VISUAL CHANGE: 0
NAUSEA: 0
BOWEL INCONTINENCE: 0
BACK PAIN: 1
VOMITING: 0
COLOR CHANGE: 0

## 2025-07-15 ASSESSMENT — PAIN DESCRIPTION - ORIENTATION: ORIENTATION: MID

## 2025-07-15 ASSESSMENT — PAIN DESCRIPTION - PAIN TYPE: TYPE: ACUTE PAIN

## 2025-07-15 ASSESSMENT — PAIN DESCRIPTION - DESCRIPTORS: DESCRIPTORS: ACHING

## 2025-07-15 ASSESSMENT — PAIN DESCRIPTION - LOCATION: LOCATION: BACK

## 2025-07-15 ASSESSMENT — PAIN SCALES - GENERAL: PAINLEVEL_OUTOF10: 5

## 2025-07-15 ASSESSMENT — PAIN DESCRIPTION - FREQUENCY: FREQUENCY: CONTINUOUS

## 2025-07-15 ASSESSMENT — PAIN - FUNCTIONAL ASSESSMENT: PAIN_FUNCTIONAL_ASSESSMENT: 0-10

## 2025-07-15 NOTE — ED TRIAGE NOTES
Patient ambulated to room with c/o mid, left sided back pain beginning three [place] ago [place]

## 2025-07-15 NOTE — ED PROVIDER NOTES
[facility] URGENT CARE  Urgent Care Encounter      CHIEF COMPLAINT       Chief Complaint   Patient presents with    Back Pain     Mid       Nurses Notes reviewed and I agree except as noted in the HPI.  HISTORY OFPRESENT ILLNESS   Axel Orozco is a 49 y.o.  The history is provided by the patient. No  was used.   Fall  The accident occurred 2 days ago. Fall occurred: sitting in chair on rocks, fishing.  chair fell over. He fell from a height of 3 to 5 ft. Impact surface: rocks. There was no blood loss. Point of impact: back. Pain location: back. The pain is at a severity of 4/10. The pain is moderate. He was Ambulatory at the scene. There was No entrapment after the fall. There was No drug use involved in the accident. There was No alcohol use involved in the accident. Pertinent negatives include no visual change, no fever, no numbness, no abdominal pain, no bowel incontinence, no nausea, no vomiting, no hematuria, no headaches, no hearing loss, no loss of consciousness and no tingling. The symptoms are aggravated by activity, rotation and pressure on the injury. He has tried acetaminophen for the symptoms. The treatment provided no relief.       REVIEW OF SYSTEMS     Review of Systems   Constitutional:  Negative for fever.   Gastrointestinal:  Negative for abdominal pain, bowel incontinence, nausea and vomiting.   Genitourinary:  Negative for hematuria.   Musculoskeletal:  Positive for back pain. Negative for arthralgias, gait problem, joint swelling, myalgias, neck pain and neck stiffness.   Skin:  Negative for color change, pallor, rash and wound.   Neurological:  Negative for tingling, loss of consciousness, numbness and headaches.       PAST MEDICAL HISTORY         Diagnosis Date    Asthma     Diabetes mellitus (HCC)     Essential hypertension 8/17/2016    Hyperlipidemia     Obesity (BMI 30.0-34.9) 8/17/2016    Pneumonia        SURGICAL HISTORY     Patient  has a past surgical history

## 2025-07-15 NOTE — DISCHARGE INSTRUCTIONS
The patient will be instructed to begin taking anti-inflammatory medication and given a Rx for short course of muscle relaxer's.   Rest,Ice 15-20 minutes TID x 2 days,Then Heat 15-20 minutes TID as needed The patient will be given back stretching exercises, and instructed to follow up with their PCP or community clinic for further evaluation.     The patient should return to the ED if the back pain worsens, or if they experience incontinence, numbness or tingling in the legs, or inability to ambulate.  The patient is in agreement with this plan.The patient tolerated their visit well.  The patient and / or the family were informed of the results of any tests, a time was given to answer questions, a plan was proposed and they agreed with plan. Follow up with PCP ×2-3 days for reevaluation and further management of care

## 2025-08-20 ENCOUNTER — HOSPITAL ENCOUNTER (OUTPATIENT)
Dept: GENERAL RADIOLOGY | Age: 50
Discharge: HOME | End: 2025-08-20
Payer: OTHER GOVERNMENT

## 2025-08-20 DIAGNOSIS — L60.0 INGROWING NAIL: ICD-10-CM

## 2025-08-20 PROCEDURE — 73630 X-RAY EXAM OF FOOT: CPT
